# Patient Record
Sex: FEMALE | Race: WHITE
[De-identification: names, ages, dates, MRNs, and addresses within clinical notes are randomized per-mention and may not be internally consistent; named-entity substitution may affect disease eponyms.]

---

## 2017-04-18 ENCOUNTER — HOSPITAL ENCOUNTER (INPATIENT)
Dept: HOSPITAL 4 - SED | Age: 77
LOS: 5 days | Discharge: HOME | DRG: 371 | End: 2017-04-23
Attending: INTERNAL MEDICINE | Admitting: INTERNAL MEDICINE
Payer: COMMERCIAL

## 2017-04-18 VITALS
DIASTOLIC BLOOD PRESSURE: 61 MMHG | TEMPERATURE: 97.9 F | HEART RATE: 99 BPM | OXYGEN SATURATION: 95 % | SYSTOLIC BLOOD PRESSURE: 155 MMHG | RESPIRATION RATE: 20 BRPM

## 2017-04-18 VITALS
SYSTOLIC BLOOD PRESSURE: 167 MMHG | DIASTOLIC BLOOD PRESSURE: 85 MMHG | HEART RATE: 95 BPM | RESPIRATION RATE: 16 BRPM | TEMPERATURE: 98.1 F | OXYGEN SATURATION: 99 %

## 2017-04-18 VITALS — WEIGHT: 177 LBS | BODY MASS INDEX: 30.22 KG/M2 | HEIGHT: 64 IN

## 2017-04-18 VITALS
TEMPERATURE: 97.9 F | OXYGEN SATURATION: 98 % | RESPIRATION RATE: 18 BRPM | HEART RATE: 62 BPM | DIASTOLIC BLOOD PRESSURE: 58 MMHG | SYSTOLIC BLOOD PRESSURE: 158 MMHG

## 2017-04-18 VITALS
SYSTOLIC BLOOD PRESSURE: 167 MMHG | RESPIRATION RATE: 16 BRPM | TEMPERATURE: 98.1 F | HEART RATE: 95 BPM | OXYGEN SATURATION: 99 % | DIASTOLIC BLOOD PRESSURE: 85 MMHG

## 2017-04-18 DIAGNOSIS — I10: ICD-10-CM

## 2017-04-18 DIAGNOSIS — E11.9: ICD-10-CM

## 2017-04-18 DIAGNOSIS — I25.10: ICD-10-CM

## 2017-04-18 DIAGNOSIS — E03.9: ICD-10-CM

## 2017-04-18 DIAGNOSIS — E87.6: ICD-10-CM

## 2017-04-18 DIAGNOSIS — E66.9: ICD-10-CM

## 2017-04-18 DIAGNOSIS — Z95.1: ICD-10-CM

## 2017-04-18 DIAGNOSIS — Z87.81: ICD-10-CM

## 2017-04-18 DIAGNOSIS — Z98.61: ICD-10-CM

## 2017-04-18 DIAGNOSIS — Z79.899: ICD-10-CM

## 2017-04-18 DIAGNOSIS — K57.30: ICD-10-CM

## 2017-04-18 DIAGNOSIS — N13.30: ICD-10-CM

## 2017-04-18 DIAGNOSIS — E78.5: ICD-10-CM

## 2017-04-18 DIAGNOSIS — E83.42: ICD-10-CM

## 2017-04-18 DIAGNOSIS — Z96.653: ICD-10-CM

## 2017-04-18 DIAGNOSIS — D86.9: ICD-10-CM

## 2017-04-18 DIAGNOSIS — R91.1: ICD-10-CM

## 2017-04-18 DIAGNOSIS — E43: ICD-10-CM

## 2017-04-18 DIAGNOSIS — Z88.8: ICD-10-CM

## 2017-04-18 DIAGNOSIS — Z79.82: ICD-10-CM

## 2017-04-18 DIAGNOSIS — A04.7: Primary | ICD-10-CM

## 2017-04-18 LAB
ALBUMIN SERPL BCP-MCNC: 2.8 G/DL (ref 3.4–4.8)
ALT SERPL W P-5'-P-CCNC: 57 U/L (ref 12–78)
ANION GAP SERPL CALCULATED.3IONS-SCNC: 4 MMOL/L (ref 5–15)
APPEARANCE UR: CLEAR
AST SERPL W P-5'-P-CCNC: 84 U/L (ref 10–37)
BACTERIA URNS QL MICRO: (no result) /HPF
BASOPHILS # BLD AUTO: 0.2 K/UL (ref 0–0.2)
BASOPHILS NFR BLD AUTO: 1.5 % (ref 0–2)
BILIRUB SERPL-MCNC: 0.6 MG/DL (ref 0–1)
BILIRUB UR QL STRIP: NEGATIVE
BUN SERPL-MCNC: 2 MG/DL (ref 8–21)
CALCIUM SERPL-MCNC: 8.3 MG/DL (ref 8.4–11)
CHLORIDE SERPL-SCNC: 102 MMOL/L (ref 98–107)
COLOR UR: YELLOW
CREAT SERPL-MCNC: 1.32 MG/DL (ref 0.55–1.3)
EOSINOPHIL # BLD AUTO: 0.2 K/UL (ref 0–0.4)
EOSINOPHIL NFR BLD AUTO: 2.2 % (ref 0–4)
ERYTHROCYTE [DISTWIDTH] IN BLOOD BY AUTOMATED COUNT: 14.1 % (ref 9–15)
GFR SERPL CREATININE-BSD FRML MDRD: (no result) ML/MIN (ref 90–?)
GLUCOSE SERPL-MCNC: 203 MG/DL (ref 70–99)
GLUCOSE UR STRIP-MCNC: NEGATIVE MG/DL
HCT VFR BLD AUTO: 31.5 % (ref 36–48)
HGB BLD-MCNC: 10.4 G/DL (ref 12–16)
HGB UR QL STRIP: NEGATIVE
INR PPP: 1.2 (ref 0.8–1.2)
KETONES UR STRIP-MCNC: NEGATIVE MG/DL
LEUKOCYTE ESTERASE UR QL STRIP: (no result)
LYMPHOCYTES # BLD AUTO: 1.4 K/UL (ref 1–5.5)
LYMPHOCYTES NFR BLD AUTO: 13.5 % (ref 20.5–51.5)
MCH RBC QN AUTO: 30 PG (ref 27–31)
MCHC RBC AUTO-ENTMCNC: 33 % (ref 32–36)
MCV RBC AUTO: 89 FL (ref 79–98)
MONOCYTES # BLD MANUAL: 0.9 K/UL (ref 0–1)
MONOCYTES # BLD MANUAL: 8.4 % (ref 1.7–9.3)
NEUTROPHILS # BLD AUTO: 7.6 K/UL (ref 1.8–7.7)
NEUTROPHILS NFR BLD AUTO: 74.4 % (ref 40–70)
NITRITE UR QL STRIP: NEGATIVE
PH UR STRIP: 6.5 [PH] (ref 5–8)
PLATELET # BLD AUTO: 452 K/UL (ref 130–430)
POTASSIUM SERPL-SCNC: 2.4 MMOL/L (ref 3.5–5.1)
PROT SERPL-MCNC: 6.3 G/DL (ref 6.4–8.3)
PROT UR QL STRIP: NEGATIVE
PROTHROMBIN TIME: 13.4 SECS (ref 9.5–12.5)
RBC # BLD AUTO: 3.54 MIL/UL (ref 4.2–6.2)
RBC #/AREA URNS HPF: (no result) /HPF (ref 0–3)
SODIUM SERPLBLD-SCNC: 138 MMOL/L (ref 136–145)
SP GR UR STRIP: <1.005 (ref 1–1.03)
UROBILINOGEN UR STRIP-MCNC: 0.2 MG/DL (ref 0.2–1)
WBC # BLD AUTO: 10.3 K/UL (ref 4.8–10.8)
WBC #/AREA URNS HPF: (no result) /HPF (ref 0–3)

## 2017-04-18 RX ADMIN — DEXTROSE AND SODIUM CHLORIDE SCH MLS/HR: 5; 900 INJECTION, SOLUTION INTRAVENOUS at 18:30

## 2017-04-18 RX ADMIN — VANCOMYCIN HYDROCHLORIDE SCH MG: 250 CAPSULE ORAL at 18:06

## 2017-04-18 RX ADMIN — VANCOMYCIN HYDROCHLORIDE SCH MG: 250 CAPSULE ORAL at 23:39

## 2017-04-18 RX ADMIN — METRONIDAZOLE SCH MLS/HR: 500 SOLUTION INTRAVENOUS at 21:35

## 2017-04-18 NOTE — NUR
# 20 gauge angiocath placed to LHA.  Use of asceptic technique.  Opsite placed 
over site.  Blood return noted.  Blood for lab drawn from site.  Flushed with 
10 cc of normal saline.  No evidence of infiltration noted.  Patient tolerated 
well.

## 2017-04-18 NOTE — NUR
OPENING NOTES

REPORT GIVEN AT BEDSIDE BY DAY SHIFT NURSE. PATIENT RESTING IN SEMI-SAWANT'S POSITION, NO 
S/S OF ACUTE DISTRESS NOTED. RESPIRATIONS EVEN AND UNLABORED. IV FLUIDS INFUSING WITH NO 
SIGNS OF INFILTRATION NOTED. FAMILY AT BEDSIDE. BED IN LOWEST POSITION, BED ALARM ON, CALL 
LIGHT WITHIN REACH. WILL CONTINUE TO MONITOR.

## 2017-04-18 NOTE — NUR
OPENING NOTES

RCEIVED PT IN  BED, PT DENIES PAIN, PT C/O OF FEELING COLD AND SHIVERING FROM TIME TO TIME. 
AFEBRILE, PROVIDED BLANKETS AS REQUESTED. CALL LIGHT IN REACH, BED IN LOW POSITION. 
INSTRUCTED TO CALL FOR ASSIST AND PAIN MED ANY TIME

## 2017-04-18 NOTE — NUR
ROUNDS

NO S/S OF ACUTE DISTRESS NOTED. IV FLUIDS INFUSING WITH NO SIGNS OF INFILTRATION NOTED. IV 
ANTIBIOTICS INFUSING, PATIENT TOLERATING WELL. FAMILY AT BEDSIDE. BED IN LOWEST POSITION, 
BED ALARM ON, CALL LIGHT WITHIN REACH. WILL CONTINUE TO MONITOR.

## 2017-04-18 NOTE — NUR
DR. BRAD VICTOR

PATIENT IS COMPLAINING OF ANXIETY AND REQUESTING MEDICATION. PATIENT STATES, "I NEED 
MEDICATION FOR ANXIETY RIGHT NOW, CAN YOU PLEASE CALL THE DR."

## 2017-04-18 NOTE — NUR
ADMIT NOTE

Received pt from ER to the floor with a diagnosis of c. diff, colitis. Admission process 
initiated. patient oriented to pain management, safety and call light-teach back done.

## 2017-04-18 NOTE — NUR
Pt has 24 GA PIV to LHA. Area appears red and swollen. PIV discontinued with 
angiocath tip intact, no bleeding, drainage or discharge from site. Bandage 
applied.

## 2017-04-18 NOTE — NUR
CONSULTATION CALLED 



REASON FOR CONSULT: C-DIFF 

PERSON WHO WAS CALLED: HOSP EXCHANGED  

CONSULTING DR: GUALBERTO BOSCH

## 2017-04-18 NOTE — NUR
Patient will be admitted to care of Dr. Dang.  Admitted to med surg unit.  
Will go to room 133. Summary report printed. Report given to Carmen MENDOZA.

## 2017-04-18 NOTE — NUR
Pt report received from REJI Orellana. Pt from [facility] r/t diarrhea and 
C. Difficile x 1 week. Family members state that pt requested to come to [facility] 
for admit.

## 2017-04-18 NOTE — NUR
CLOSING NOTES, 

PT IN BED, FAMILY AT BEDSIDE, SET UP FOR DINNER CLEAR LIQUID DIET, 1800 MED OFFERED AND 
TAKEN. CALL LIGHT IN REACH, BED IN LOW POSTION. IV FLUIDS INFUSING. NO C./O PAIN.

## 2017-04-18 NOTE — NUR
-------------------------------------------------------------------------------

            *** Note sadi in EDM - 04/18/17 at 1339 by SURESH ***            

-------------------------------------------------------------------------------

Pt has 24 GA PIV to RHA. Area appears red and swollen. PIV discontinued with 
angiocath tip intact, no bleeding, drainage or discharge from site. Bandage 
applied.

## 2017-04-18 NOTE — NUR
PAGED:

I PAGED STAR JOHNSON @ [phone]

I SPOKE WITH AISHA ALEX ON CALL

NUMBER I CALLED: [phone]

## 2017-04-19 VITALS
RESPIRATION RATE: 17 BRPM | TEMPERATURE: 98.7 F | SYSTOLIC BLOOD PRESSURE: 143 MMHG | HEART RATE: 97 BPM | OXYGEN SATURATION: 93 % | DIASTOLIC BLOOD PRESSURE: 59 MMHG

## 2017-04-19 VITALS
RESPIRATION RATE: 16 BRPM | OXYGEN SATURATION: 94 % | SYSTOLIC BLOOD PRESSURE: 152 MMHG | TEMPERATURE: 97.1 F | DIASTOLIC BLOOD PRESSURE: 76 MMHG | HEART RATE: 95 BPM

## 2017-04-19 VITALS
DIASTOLIC BLOOD PRESSURE: 63 MMHG | TEMPERATURE: 97.8 F | HEART RATE: 101 BPM | SYSTOLIC BLOOD PRESSURE: 156 MMHG | OXYGEN SATURATION: 97 % | RESPIRATION RATE: 20 BRPM

## 2017-04-19 VITALS
TEMPERATURE: 97.2 F | OXYGEN SATURATION: 94 % | DIASTOLIC BLOOD PRESSURE: 72 MMHG | SYSTOLIC BLOOD PRESSURE: 141 MMHG | RESPIRATION RATE: 18 BRPM | HEART RATE: 94 BPM

## 2017-04-19 VITALS
TEMPERATURE: 99.6 F | OXYGEN SATURATION: 95 % | SYSTOLIC BLOOD PRESSURE: 151 MMHG | DIASTOLIC BLOOD PRESSURE: 76 MMHG | RESPIRATION RATE: 18 BRPM | HEART RATE: 94 BPM

## 2017-04-19 VITALS
SYSTOLIC BLOOD PRESSURE: 168 MMHG | DIASTOLIC BLOOD PRESSURE: 67 MMHG | OXYGEN SATURATION: 100 % | RESPIRATION RATE: 18 BRPM | TEMPERATURE: 98.1 F | HEART RATE: 100 BPM

## 2017-04-19 VITALS — HEART RATE: 94 BPM

## 2017-04-19 LAB
ALBUMIN SERPL BCP-MCNC: 2.6 G/DL (ref 3.4–4.8)
ALT SERPL W P-5'-P-CCNC: 48 U/L (ref 12–78)
ANION GAP SERPL CALCULATED.3IONS-SCNC: 6 MMOL/L (ref 5–15)
AST SERPL W P-5'-P-CCNC: 55 U/L (ref 10–37)
BILIRUB SERPL-MCNC: 0.6 MG/DL (ref 0–1)
BUN SERPL-MCNC: 3 MG/DL (ref 8–21)
CALCIUM SERPL-MCNC: 7.9 MG/DL (ref 8.4–11)
CHLORIDE SERPL-SCNC: 106 MMOL/L (ref 98–107)
CREAT SERPL-MCNC: 1.21 MG/DL (ref 0.55–1.3)
GFR SERPL CREATININE-BSD FRML MDRD: (no result) ML/MIN (ref 90–?)
GLUCOSE SERPL-MCNC: 135 MG/DL (ref 70–99)
POTASSIUM SERPL-SCNC: 3.1 MMOL/L (ref 3.5–5.1)
POTASSIUM SERPL-SCNC: 3.9 MMOL/L (ref 3.5–5.1)
PROT SERPL-MCNC: 6 G/DL (ref 6.4–8.3)
SODIUM SERPLBLD-SCNC: 142 MMOL/L (ref 136–145)

## 2017-04-19 RX ADMIN — VANCOMYCIN HYDROCHLORIDE SCH MG: 250 CAPSULE ORAL at 18:31

## 2017-04-19 RX ADMIN — Medication SCH CAP: at 20:38

## 2017-04-19 RX ADMIN — DEXTROSE AND SODIUM CHLORIDE SCH MLS/HR: 5; 900 INJECTION, SOLUTION INTRAVENOUS at 04:30

## 2017-04-19 RX ADMIN — VANCOMYCIN HYDROCHLORIDE SCH MG: 250 CAPSULE ORAL at 11:56

## 2017-04-19 RX ADMIN — DEXTROSE AND SODIUM CHLORIDE SCH MLS/HR: 5; 900 INJECTION, SOLUTION INTRAVENOUS at 17:05

## 2017-04-19 RX ADMIN — POTASSIUM CHLORIDE SCH MEQ: 1.5 POWDER, FOR SOLUTION ORAL at 13:37

## 2017-04-19 RX ADMIN — POTASSIUM CHLORIDE SCH MEQ: 1.5 POWDER, FOR SOLUTION ORAL at 09:25

## 2017-04-19 RX ADMIN — DEXTROSE AND SODIUM CHLORIDE SCH MLS/HR: 5; 900 INJECTION, SOLUTION INTRAVENOUS at 18:31

## 2017-04-19 RX ADMIN — VANCOMYCIN HYDROCHLORIDE SCH MG: 250 CAPSULE ORAL at 06:17

## 2017-04-19 RX ADMIN — METRONIDAZOLE SCH MLS/HR: 500 SOLUTION INTRAVENOUS at 20:39

## 2017-04-19 RX ADMIN — VANCOMYCIN HYDROCHLORIDE SCH MG: 250 CAPSULE ORAL at 23:07

## 2017-04-19 RX ADMIN — METRONIDAZOLE SCH MLS/HR: 500 SOLUTION INTRAVENOUS at 06:18

## 2017-04-19 RX ADMIN — METRONIDAZOLE SCH MLS/HR: 500 SOLUTION INTRAVENOUS at 13:37

## 2017-04-19 RX ADMIN — MONTELUKAST SCH MG: 10 TABLET, FILM COATED ORAL at 18:30

## 2017-04-19 RX ADMIN — Medication SCH MG: at 09:25

## 2017-04-19 RX ADMIN — IPRATROPIUM BROMIDE AND ALBUTEROL SULFATE SCH ML: .5; 3 SOLUTION RESPIRATORY (INHALATION) at 20:07

## 2017-04-19 RX ADMIN — ATORVASTATIN CALCIUM SCH MG: 20 TABLET, FILM COATED ORAL at 09:25

## 2017-04-19 NOTE — NUR
Wicho scale evaluation:



Patient evaluated for a low Wicho score of 16. Patient was awake, alert, oriented, and 
received in a Innis bed with an Isoflex JIL mattress. Low air loss therapy is initiated. 
Patient is able to turn in bed independently, and uses bedside commode with assist. Skin is 
fair; Left lower extremity has multiple areas of bruising secondary to a recent left total 
hip arthroplasty. Encourage and assist patient as needed with repositioning every 2 hours 
with pillow support, and offload pressure areas with pillows for pressure redistribution 
elevate, offload, and flow bilateral heels with pillows. Perform skin care and monitor skin 
integrity every shift. Maintain patient on a low air loss mattress.

## 2017-04-19 NOTE — NUR
ROUNDS

PATIENT IN BATHROOM WITH ASSIST OF CNA. GAIT WAS STEADY WITH ASSIST. PATIENT NOW RESTING IN 
BED IN SEMI-SAWANT'S NO S/S OF ACUTE DISTRESS NOTED. FALL PRECAUTIONS IN PLACE, WILL 
CONTINUE TO MONITOR, CALL LIGHT WITHIN REACH.

## 2017-04-19 NOTE — NUR
ROUNDS

PATIENT IS SLEEPING WITH VISIBLE RISE AND FALL OF CHEST NOTED. NO ACUTE S/S OF DISTRESS.  
BED IN LOWEST POSITION, FALL PRECAUTIONS IN PLACE, BED ALARM ON. CALL LIGHT WITHIN REACH. 
WILL CONTINUE TO MONITOR

## 2017-04-19 NOTE — NUR
NOTE:

PATIENT IS RESTING COMFORTABLY. NO S/S OF DISTRESS OR SOB. PATIENT IS ALERT AND ORIENTED, 
ABLE TO EXPRESS NEEDS, AND ASK FOR ASSISTANCE. FAMILY AT BEDSIDE. CALL LIGHT IN REACH, BED 
IN LOWEST POSITION, AND WILL CONTINUE TO MONITOR.

## 2017-04-19 NOTE — NUR
Nutrition Update



Wicho Scale 16 noted.

Pt admitted for C. diff colitis.

Diet: clear liquid

BMI: 30.4 kg/m2



RD to follow per nutrition care standards.

## 2017-04-19 NOTE — NUR
NOTE:

PATIENT IS RESTING COMFORTABLY IN BED. NO S/S OF DISTRESS OR SOB. PATIENT IS AWAKE AND 
ALERT. FAMILY AT BEDSIDE. CALL LIGHT IN REACH, BED IN LOWEST POSITION, AND WILL CONTINUE TO 
MONITOR.

## 2017-04-19 NOTE — NUR
ROUNDS

ASSISTED PATIENT TO BATHROOM. GAIT WAS STEADY WITH ASSIST. PATIENT NOW RESTING IN BED IN 
SEMI-SAWANT'S NO S/S OF ACUTE DISTRESS NOTED. FALL PRECAUTIONS IN PLACE, WILL CONTINUE TO 
MONITOR, CALL LIGHT WITHIN REACH.

## 2017-04-19 NOTE — NUR
DR. SALINAS 

SPOKE WITH DR. SALINAS. FAMILY IS REQUESTING CONSULT WITH DR. MARTI. FAMILY STATES PATIENT 
HAS SARCOIDOSIS AND IS DEVELOPING A COUGHT. DR. MARTI WAS ON HER CASE PREVIOUSLY AND SEEING 
HER TWICE A DAY AND THEY WOULD LIKE HIM TO CONTINUE HIS CARE HERE. DR. SALINAS SAID OKAY TO 
PLACE ORDER FOR PULMONARY CONSULT WITH DR. MARTI. ORDERS NOTED AND CARRIED OUT. FAMILY 
INFORMED.

## 2017-04-19 NOTE — NUR
NOTE:

PATIENT IS RESTING COMFORTABLY IN BED. NO S/S OF DISTRESS OR SOB. PATIENT IS ALERT AND 
ORIENTED, ABLE TO EXPRESS NEEDS, AND ASK FOR ASSISTANCE. FAMILY AT BEDSIDE. CALL LIGHT IN 
REACH, BED IN LOWEST POSITION, AND WILL CONTINUE TO MONITOR.

## 2017-04-19 NOTE — NUR
CLOSING NOTE:

PATIENT IS RESTING COMFORTABLY IN BED. NO S/S OF DISTRESS OR SOB. PATIENT IS ALERT AND 
ORIENTED, ABLE TO EXPRESS NEEDS, AND ASK FOR ASSISTANCE. FAMILY IS AT BEDSIDE. CALL LIGHT IN 
REACH, BED IN LOWEST POSITION, AND WILL GIVE REPORT TO NIGHT NURSE.

## 2017-04-19 NOTE — NUR
OPENING NOTE:

RECEIVED REPORT FROM NIGHT NURSE. PATIENT IS RESTING COMFORTABLY IN BED. NO S/S OF DISTRESS 
OR SOB. FAMILY AT BEDSIDE. PATIENT IS ALERT AND ORIENTED, ABLE TO EXPRESS NEEDS, AND ASK FOR 
ASSISTANCE. VITAL SIGNS WNL, ASSESSMENT COMPLETE. IV IA PATENT AND INFUSING. CALL LIGHT IN 
REACH, BED IN LOWEST POSITION, AND WILL CONTINUE TO MONITOR.

## 2017-04-19 NOTE — NUR
INITIAL ROUNDS



RECVD PT IN BED, A/A/O X3 WITH DAUGHTER @ BEDSIDE. NO S/S OF ANY PAIN. IV NOTED TO L HAND G 
20, NO INFILTRATE WITH GOOD BLOOD RETURN. BUE ARE STRONG WITH MILD WEAKNESS TO BLE, 
AMBULATORY WITH WALKER AND WITH ASSIST. DISCUSSED PLAN OF CARE WITH PT AND VERBALIZED 
UNDERSTANDING. BED IN LOW POSITION WITH CALL LIGHT WITHIN REACH. WILL CONT TO MONITOR

## 2017-04-19 NOTE — NUR
ASSISTED TO COMMODE



ASSISTED PT TO COMMODE AND SAFELY BACK TO BED WITH ELENA SIFUENTES. NO S/S OF ANY PAIN AND NO 
DISTRESS NOTED. DAUGHTER @ BEDSIDE.CALL LIGHT WITHIN REACH, WILL CONT TO MONITOR.

## 2017-04-19 NOTE — NUR
ROUNDS



PT IS RESTING COMFORTABLY WITH DAUGHTER AT BEDSIDE. NO S/S OF ANY PAIN OR DISTRESS NOTED. 
BED IN LOW POSITION WITH CALL LIGHT WITHIN REACH. WILL CONT TO MONITOR.

## 2017-04-19 NOTE — NUR
CLOSING NOTES

PATIENT SLEEPING COMFORTABLY WITH VISIBLE RISE AND FALL OF CHEST. NO S/S OF ACUTE DISTRESS 
NOTED. IV PATENT AND INFUSING. DAUGHTER AT BEDSIDE. BED IN LOWEST POSITION, BED ALARM ON, 
CALL LIGHT WITHIN REACH. WILL ENDORSE CARE TO DAY SHIFT NURSE.

## 2017-04-20 VITALS
RESPIRATION RATE: 18 BRPM | DIASTOLIC BLOOD PRESSURE: 72 MMHG | SYSTOLIC BLOOD PRESSURE: 158 MMHG | HEART RATE: 96 BPM | OXYGEN SATURATION: 92 % | TEMPERATURE: 99 F

## 2017-04-20 VITALS
HEART RATE: 97 BPM | SYSTOLIC BLOOD PRESSURE: 144 MMHG | OXYGEN SATURATION: 97 % | RESPIRATION RATE: 17 BRPM | DIASTOLIC BLOOD PRESSURE: 69 MMHG | TEMPERATURE: 97.8 F

## 2017-04-20 VITALS
HEART RATE: 97 BPM | TEMPERATURE: 98.9 F | RESPIRATION RATE: 18 BRPM | DIASTOLIC BLOOD PRESSURE: 77 MMHG | SYSTOLIC BLOOD PRESSURE: 157 MMHG | OXYGEN SATURATION: 92 %

## 2017-04-20 VITALS
SYSTOLIC BLOOD PRESSURE: 136 MMHG | TEMPERATURE: 99 F | HEART RATE: 113 BPM | RESPIRATION RATE: 30 BRPM | DIASTOLIC BLOOD PRESSURE: 54 MMHG | OXYGEN SATURATION: 90 %

## 2017-04-20 VITALS
RESPIRATION RATE: 18 BRPM | SYSTOLIC BLOOD PRESSURE: 155 MMHG | TEMPERATURE: 99 F | DIASTOLIC BLOOD PRESSURE: 70 MMHG | HEART RATE: 107 BPM | OXYGEN SATURATION: 92 %

## 2017-04-20 VITALS
DIASTOLIC BLOOD PRESSURE: 63 MMHG | TEMPERATURE: 98 F | SYSTOLIC BLOOD PRESSURE: 140 MMHG | RESPIRATION RATE: 16 BRPM | HEART RATE: 100 BPM | OXYGEN SATURATION: 97 %

## 2017-04-20 LAB
ALBUMIN SERPL BCP-MCNC: 2.6 G/DL (ref 3.4–4.8)
ALT SERPL W P-5'-P-CCNC: 38 U/L (ref 12–78)
ANION GAP SERPL CALCULATED.3IONS-SCNC: 4 MMOL/L (ref 5–15)
AST SERPL W P-5'-P-CCNC: 33 U/L (ref 10–37)
BASOPHILS # BLD AUTO: 0 K/UL (ref 0–0.2)
BASOPHILS NFR BLD AUTO: 0.1 % (ref 0–2)
BILIRUB SERPL-MCNC: 0.5 MG/DL (ref 0–1)
BUN SERPL-MCNC: 1 MG/DL (ref 8–21)
CALCIUM SERPL-MCNC: 7.8 MG/DL (ref 8.4–11)
CHLORIDE SERPL-SCNC: 104 MMOL/L (ref 98–107)
CREAT SERPL-MCNC: 0.96 MG/DL (ref 0.55–1.3)
EOSINOPHIL # BLD AUTO: 0.3 K/UL (ref 0–0.4)
EOSINOPHIL NFR BLD AUTO: 3.5 % (ref 0–4)
ERYTHROCYTE [DISTWIDTH] IN BLOOD BY AUTOMATED COUNT: 14.4 % (ref 9–15)
GFR SERPL CREATININE-BSD FRML MDRD: (no result) ML/MIN (ref 90–?)
GLUCOSE SERPL-MCNC: 138 MG/DL (ref 70–99)
HCT VFR BLD AUTO: 31.4 % (ref 36–48)
HGB BLD-MCNC: 10.2 G/DL (ref 12–16)
LYMPHOCYTES # BLD AUTO: 1.5 K/UL (ref 1–5.5)
LYMPHOCYTES NFR BLD AUTO: 16.6 % (ref 20.5–51.5)
MCH RBC QN AUTO: 29 PG (ref 27–31)
MCHC RBC AUTO-ENTMCNC: 32 % (ref 32–36)
MCV RBC AUTO: 90 FL (ref 79–98)
MONOCYTES # BLD MANUAL: 1 K/UL (ref 0–1)
MONOCYTES # BLD MANUAL: 10.8 % (ref 1.7–9.3)
NEUTROPHILS # BLD AUTO: 6.1 K/UL (ref 1.8–7.7)
NEUTROPHILS NFR BLD AUTO: 69 % (ref 40–70)
PLATELET # BLD AUTO: 403 K/UL (ref 130–430)
POTASSIUM SERPL-SCNC: 2.9 MMOL/L (ref 3.5–5.1)
PROT SERPL-MCNC: 5.7 G/DL (ref 6.4–8.3)
RBC # BLD AUTO: 3.49 MIL/UL (ref 4.2–6.2)
SODIUM SERPLBLD-SCNC: 140 MMOL/L (ref 136–145)
WBC # BLD AUTO: 8.9 K/UL (ref 4.8–10.8)

## 2017-04-20 RX ADMIN — Medication SCH CAP: at 21:36

## 2017-04-20 RX ADMIN — METRONIDAZOLE SCH MLS/HR: 500 SOLUTION INTRAVENOUS at 21:35

## 2017-04-20 RX ADMIN — POTASSIUM CHLORIDE SCH MEQ: 20 TABLET, EXTENDED RELEASE ORAL at 15:16

## 2017-04-20 RX ADMIN — Medication SCH CAP: at 09:35

## 2017-04-20 RX ADMIN — VANCOMYCIN HYDROCHLORIDE SCH MG: 250 CAPSULE ORAL at 23:10

## 2017-04-20 RX ADMIN — Medication PRN MG: at 13:27

## 2017-04-20 RX ADMIN — ATORVASTATIN CALCIUM SCH MG: 20 TABLET, FILM COATED ORAL at 09:36

## 2017-04-20 RX ADMIN — Medication SCH MG: at 09:35

## 2017-04-20 RX ADMIN — SODIUM CHLORIDE SCH MLS/HR: 9 INJECTION, SOLUTION INTRAVENOUS at 15:18

## 2017-04-20 RX ADMIN — VANCOMYCIN HYDROCHLORIDE SCH MG: 250 CAPSULE ORAL at 12:00

## 2017-04-20 RX ADMIN — IPRATROPIUM BROMIDE AND ALBUTEROL SULFATE SCH ML: .5; 3 SOLUTION RESPIRATORY (INHALATION) at 20:13

## 2017-04-20 RX ADMIN — VANCOMYCIN HYDROCHLORIDE SCH MG: 250 CAPSULE ORAL at 06:23

## 2017-04-20 RX ADMIN — MONTELUKAST SCH MG: 10 TABLET, FILM COATED ORAL at 17:04

## 2017-04-20 RX ADMIN — IPRATROPIUM BROMIDE AND ALBUTEROL SULFATE SCH ML: .5; 3 SOLUTION RESPIRATORY (INHALATION) at 16:37

## 2017-04-20 RX ADMIN — IPRATROPIUM BROMIDE AND ALBUTEROL SULFATE SCH ML: .5; 3 SOLUTION RESPIRATORY (INHALATION) at 09:04

## 2017-04-20 RX ADMIN — DEXTROSE AND SODIUM CHLORIDE SCH MLS/HR: 5; 900 INJECTION, SOLUTION INTRAVENOUS at 13:30

## 2017-04-20 RX ADMIN — POTASSIUM CHLORIDE SCH MEQ: 20 TABLET, EXTENDED RELEASE ORAL at 13:27

## 2017-04-20 RX ADMIN — METRONIDAZOLE SCH MLS/HR: 500 SOLUTION INTRAVENOUS at 06:23

## 2017-04-20 RX ADMIN — LEVOTHYROXINE SODIUM SCH MG: 50 TABLET ORAL at 06:24

## 2017-04-20 RX ADMIN — METRONIDAZOLE SCH MLS/HR: 500 SOLUTION INTRAVENOUS at 15:03

## 2017-04-20 RX ADMIN — CLOPIDOGREL BISULFATE SCH MG: 75 TABLET, FILM COATED ORAL at 09:36

## 2017-04-20 RX ADMIN — VANCOMYCIN HYDROCHLORIDE SCH MG: 250 CAPSULE ORAL at 17:04

## 2017-04-20 NOTE — NUR
OPENING NOTES

REPORT GIVEN AT BEDSIDE BY DAY SHIFT NURSE. PATIENT IS RESTING IN SEMI-SAWANT'S POSITION 
WITH DAUGHTER AT BEDSIDE. IV IS PATENT AND RUNNING. NO S/S OF DISTRESS NOTED. BED IN LOWEST 
POSITION, BED ALARM ON, CALL LIGHT WITHIN REACH. WILL CONTINUE TO MONITOR.

## 2017-04-20 NOTE — NUR
ROUNDS

PATIENT IS SITTING IN SEMI-SAWANT'S POSITION WITH NASAL CANNULA AT 2L. OXYGEN STAURATIONS 
ARE. BREATHING IS EVEN AND UNLABORED. PATIENT IS COUGHING. NO S/S OF ACUTE DISTRESS NOTED. 
WILL CONTINUE TO MONITOR. FALL PREACUTIONS IN PLACE AND DAUGHTER AT BEDSIDE. CALL LIGHT 
WITHIN REACH.

## 2017-04-20 NOTE — NUR
FOLLOW-UP ON CONSULTATION PAGED



REASON FOR CONSULTATION:C.DIFF, COLITIS

WAS CONSULT CALLED?Y

PERSON WHO WAS NOTIFIED:ROGERIO

CONSULTING PHYSICIAN:LEONARD HOBBS

CONSULTANT SPECIALTY:INFEXTIOUS DISEASE

CONSULTANT PHONE NUMBER:[phone]

## 2017-04-20 NOTE — NUR
ROUNDS

PATIENT IS BREATHING SHALLOW WITH RESPIRATIONS OF 32 AND OXYGEN SATURATION OF 88-89%. LUNGS 
HAVE A WHEEZE BILATERAL IN UPPER LOBES, AND CRACKLES NOTED IN LOWER LOBES. PATIENT DOES HAVE 
A HISTORY OF COPD AND SARCOIDOSIS. O2 APPLIED AT 2L BY NASAL CANNULA. FALL PRECAUTIONS IN 
PLACE, WILL CONTINUE TO MONITOR FREQUENTLY.

## 2017-04-20 NOTE — NUR
ASSISTED TO COMMODE



PT WAS ASSISTED TO COMMODE BY ELENA SIFUENTES. PT REFUSED TO BE ASSISTED BY MALE NURSE D/T 
Mormonism BELIEFS. BED IN LOW POSITION WITH CALL LIGHT WITHIN REACH. WILL CONT TO MONITOR.

## 2017-04-20 NOTE — NUR
TEMP 99.8



PT'S DAUGHTER INSISTING THAT HER MOTHER HAS A FEVER. I EXPLAINED TO DAUGHTER THAT WE ONLY 
CONSIDER FEVER IF TEMP .4 AND ABOVE. REMOVED 3 BLANKETS FROM PT AND GAVE COOLING 
MEASURES. WILL RECHECK TEMP AGAIN.

## 2017-04-20 NOTE — NUR
rn notes:

patient is aaox 4. afebrile. vss stable. has iv access on the left hand 320.D5ns at 70cc/hr 
infusing on well. daughter at the bedside. hob of bed elevated. bed in low position. call 
lights within reach. safety measures maintained. no sob nor distress noted. non productive 
cough with small phelgm noted. informed patient to [facility] for assistance. still on contact 
isolation for c dificile precaution.

## 2017-04-20 NOTE — NUR
patient is sitting on the chair. son is at the bedside. patient is stable. no pain nor 
distress noted.

## 2017-04-20 NOTE — NUR
FINAL ROUNDS



PT IS RESTING @ THIS TIME. NO S/S OF PAIN OR ANY DISTRESS. V/S ARE WNL. ALL NEEDS MET AND 
ANTICIPATED BY NOC NURSES. BED IN LOW POSITION WITH SIDE RAILS UP X 2 FOR SAFETY. CALL LIGHT 
WITHIN REACH; ENDORSED.

## 2017-04-21 VITALS
SYSTOLIC BLOOD PRESSURE: 142 MMHG | TEMPERATURE: 99.7 F | DIASTOLIC BLOOD PRESSURE: 62 MMHG | HEART RATE: 113 BPM | RESPIRATION RATE: 16 BRPM | OXYGEN SATURATION: 93 %

## 2017-04-21 VITALS
OXYGEN SATURATION: 92 % | DIASTOLIC BLOOD PRESSURE: 66 MMHG | RESPIRATION RATE: 21 BRPM | TEMPERATURE: 97.2 F | HEART RATE: 100 BPM | SYSTOLIC BLOOD PRESSURE: 145 MMHG

## 2017-04-21 VITALS
SYSTOLIC BLOOD PRESSURE: 148 MMHG | DIASTOLIC BLOOD PRESSURE: 65 MMHG | HEART RATE: 102 BPM | RESPIRATION RATE: 18 BRPM | TEMPERATURE: 98.8 F | OXYGEN SATURATION: 91 %

## 2017-04-21 VITALS
HEART RATE: 92 BPM | OXYGEN SATURATION: 95 % | SYSTOLIC BLOOD PRESSURE: 139 MMHG | TEMPERATURE: 97.6 F | DIASTOLIC BLOOD PRESSURE: 64 MMHG | RESPIRATION RATE: 16 BRPM

## 2017-04-21 VITALS
RESPIRATION RATE: 15 BRPM | DIASTOLIC BLOOD PRESSURE: 56 MMHG | OXYGEN SATURATION: 96 % | TEMPERATURE: 97.5 F | HEART RATE: 111 BPM | SYSTOLIC BLOOD PRESSURE: 133 MMHG

## 2017-04-21 VITALS
TEMPERATURE: 97.8 F | OXYGEN SATURATION: 95 % | SYSTOLIC BLOOD PRESSURE: 142 MMHG | RESPIRATION RATE: 17 BRPM | HEART RATE: 94 BPM | DIASTOLIC BLOOD PRESSURE: 64 MMHG

## 2017-04-21 LAB
ANION GAP SERPL CALCULATED.3IONS-SCNC: 5 MMOL/L (ref 5–15)
BUN SERPL-MCNC: 3 MG/DL (ref 8–21)
CALCIUM SERPL-MCNC: 8.1 MG/DL (ref 8.4–11)
CHLORIDE SERPL-SCNC: 104 MMOL/L (ref 98–107)
CREAT SERPL-MCNC: 0.91 MG/DL (ref 0.55–1.3)
GFR SERPL CREATININE-BSD FRML MDRD: (no result) ML/MIN (ref 90–?)
GLUCOSE SERPL-MCNC: 128 MG/DL (ref 70–99)
POTASSIUM SERPL-SCNC: 4.3 MMOL/L (ref 3.5–5.1)
SODIUM SERPLBLD-SCNC: 138 MMOL/L (ref 136–145)

## 2017-04-21 RX ADMIN — VANCOMYCIN HYDROCHLORIDE SCH MG: 250 CAPSULE ORAL at 06:17

## 2017-04-21 RX ADMIN — ATORVASTATIN CALCIUM SCH MG: 20 TABLET, FILM COATED ORAL at 10:15

## 2017-04-21 RX ADMIN — Medication SCH CAP: at 21:30

## 2017-04-21 RX ADMIN — MONTELUKAST SCH MG: 10 TABLET, FILM COATED ORAL at 18:21

## 2017-04-21 RX ADMIN — IPRATROPIUM BROMIDE AND ALBUTEROL SULFATE SCH ML: .5; 3 SOLUTION RESPIRATORY (INHALATION) at 15:44

## 2017-04-21 RX ADMIN — SODIUM CHLORIDE SCH MLS/HR: 9 INJECTION, SOLUTION INTRAVENOUS at 21:32

## 2017-04-21 RX ADMIN — IPRATROPIUM BROMIDE AND ALBUTEROL SULFATE SCH ML: .5; 3 SOLUTION RESPIRATORY (INHALATION) at 21:12

## 2017-04-21 RX ADMIN — VANCOMYCIN HYDROCHLORIDE SCH MG: 250 CAPSULE ORAL at 18:21

## 2017-04-21 RX ADMIN — Medication PRN MG: at 00:11

## 2017-04-21 RX ADMIN — Medication SCH CAP: at 10:16

## 2017-04-21 RX ADMIN — Medication PRN MG: at 06:25

## 2017-04-21 RX ADMIN — IPRATROPIUM BROMIDE AND ALBUTEROL SULFATE SCH ML: .5; 3 SOLUTION RESPIRATORY (INHALATION) at 09:16

## 2017-04-21 RX ADMIN — Medication SCH MG: at 10:16

## 2017-04-21 RX ADMIN — LEVOTHYROXINE SODIUM SCH MG: 50 TABLET ORAL at 06:17

## 2017-04-21 RX ADMIN — METRONIDAZOLE SCH MLS/HR: 500 SOLUTION INTRAVENOUS at 06:18

## 2017-04-21 RX ADMIN — METRONIDAZOLE SCH MLS/HR: 500 SOLUTION INTRAVENOUS at 14:11

## 2017-04-21 RX ADMIN — VANCOMYCIN HYDROCHLORIDE SCH MG: 250 CAPSULE ORAL at 12:15

## 2017-04-21 RX ADMIN — CLOPIDOGREL BISULFATE SCH MG: 75 TABLET, FILM COATED ORAL at 10:16

## 2017-04-21 RX ADMIN — METRONIDAZOLE SCH MLS/HR: 500 SOLUTION INTRAVENOUS at 21:31

## 2017-04-21 RX ADMIN — Medication PRN MG: at 18:26

## 2017-04-21 NOTE — NUR
INITIAL NOTES:

PT A/O X3, [place] SPEAKING, FAMILY AT BEDSIDE ; NOT IN ANY ACUTE DISTRESS; VITALS ARE STABLE 
; DENIED ANY PAIN OR DISCOMFORT AT THIS TIME; ASSESSMENT DONE ; NOTICED BRUISES TO BOTH ARMS 
, THIGH, AND ABDOMEN ; LEFT HIP WITH HEALED INCISION;PT IS COUGHING OCCASIONALLY , MEDICATED 
EARLIER ; ON ROOM AIR AT THIS TIME; 91% ,PT HAS THE H/O COPD ; O2 NOTED AT BEDSIDE , WILL 
MONITOR  ; BED IN LOW AND  LOCK POSITION ; CALL BELL IN REACH . WILL CONTINUE TO MONITOR.

-------------------------------------------------------------------------------

Addendum: 04/22/17 at 0040 by Roosevelt Pinto RN

-------------------------------------------------------------------------------

FAMILY STATED THAT PT NEEDS ATIVAN , SHE GETS ANXIOUS AT TIMES , INFORMED DAUGHTER THAT 
THERE IA AN ORDER FOR ATIVAN IM ,WILL GIVE WHEN SHE GET ANXIOUS , DAUGHTER STATED THEY DONT 
WANT TO GIVE HER  IM ; INFORMED FAMILY THAT WILL CALL MD TO GET A NEW ORDER .

## 2017-04-21 NOTE — NUR
ANTI-ANXIETY MEDICATION

PATIENT GIVEN THE ORDERED DOSE OF ANTI-ANXIETY MEDICATION. WILL REASSESS. NO S/S OF ACUTE 
DISTRESS NOTED. FALL PRECAUTIONS IN PLACE. DAUGHTER AT BEDSIDE. CALL LIGHT WITHIN REACH. 
WILL CONTINUE TO MONITOR FREQUENTLY.

## 2017-04-21 NOTE — NUR
PAIN

PATIENT IS COMPLAINING OF PAIN 7/10 THROUGHOUT LEFT LEG. CURRENTLY NO PAIN MEDS ARE ORDERE. 
WILL PAGE  FOR ORDERS.

## 2017-04-21 NOTE — NUR
PAGED  FOR ORDERS

PATIENT IS ANXIOUS AND REQUESTED SOMETHING FOR ANXIETY. THERE ARE NO MEDS FOR ANXIETY,  
[place]AN FOR ORDERS

## 2017-04-21 NOTE — NUR
Dr. Eisenberg



Called and spoke with MD. From her stand point patient is cleared to be discharged. MD will 
be coming in an hour.

## 2017-04-21 NOTE — NUR
AM Notes



Pt aaox4 but Yoruba speaking only. No complaints of pain or discomfort at this time. No sob, 
difficulty breathing or distress noted. O2 via nasal canula @ 1L in place. Healing left hip 
incision noted. Bilateral scd in place. Educated about fall and safety precautions. 
Encouraged to call for assistance. Call light within reach. Will monitor.

## 2017-04-21 NOTE — NUR
Rounds



Pt asleep. No signs of facial grimacing for pain or discomfort. No distress noted. Daughter 
at bedside. Will monitor.

## 2017-04-21 NOTE — NUR
ROUNDS

PATIENT BREATHING LABORED AND EVEN. OXYGEN TITRATION LOWERED TO 1L AND PATIENT O2 SAT AT 
93%. TEMPERATURE HAS RISEN FROM 99F AT 1955 TO 99.7, COOLING MEASURES OF WET CLOTH AND ICE 
PACKS TAKEN. PATIENT IS CLAMMY AND WARM TO TOUCH. PATIENT STATES SHE IS SHIVERING. WILL 
CONTINUE TO MONITOR TEMPERATURE FREQUENTLY. DAUGHTER AT BEDSIDE. FALL PRECAUTIONS IN PLACE, 
BED ALARM ON, CALL LIGHT WITHIN REACH.

## 2017-04-21 NOTE — NUR
LCSW called and spoke with PA , Shonna ([phone]), to inform her of pt's 
discharge order.  Shonna states that she is aware and is following up regarding pt's 
discharge needs.

## 2017-04-21 NOTE — NUR
Initial Notes



Pt asleep. No signs of facial grimacing for pain or discomfort. No distress noted. Daughter 
at bedside. Encouraged to call for assistance. Will monitor.

## 2017-04-21 NOTE — NUR
Rounds



Pt awake assisted to bedside commode. No complaints of pain or discomfort. No distress 
noted. Assisted back to bed and kept comfortable. Encouraged to call for assistance. Will 
monitor.

## 2017-04-21 NOTE — NUR
CLOSING NOTES

PATIENT IS RESTING, AWAKE AND ALERT. DAUGHTER AT BEDSIDE, PATIENT STATES SHE IS NERVOUS. IV 
FLUIDS AND ANTIBIOTICS INFUSING, PATIENT TOLERATING WELL. BED IN LOWEST POSITION, BED ALARM 
ON, CALL LIGHT IN RIGHT HAND. WILL ENDORSE CARE TO DAY SHIFT.

## 2017-04-21 NOTE — NUR
PAGED  FOR ORDERS, 3RD PAGE

PATIENT IS ANXIOUS AND REQUESTED SOMETHING FOR ANXIETY. THERE ARE NO MEDS FOR ANXIETY,  
PAGED FOR ORDERS

## 2017-04-21 NOTE — NUR
PAGED  FOR ORDERS, 2ND PAGE

PATIENT IS ANXIOUS AND REQUESTED SOMETHING FOR ANXIETY. THERE ARE NO MEDS FOR ANXIETY,  
PAGED FOR ORDERS

## 2017-04-21 NOTE — NUR
Dr. Mckeon



Called and spoke with MD if patient cleared to be discharged home. MD ordered patient is 
cleared to be discharged home.

## 2017-04-21 NOTE — NUR
MEDICATION:

DUE MEDS GIVEN ; PT IS COMFORTABLE ; NOT IN ANY ACUTE DISTRESS ; WILL CONTINUE TO MONITOR.

## 2017-04-21 NOTE — NUR
ROUNDS

PATIENT RESTING, SLEEPING, WITH VISIBLE RISE AND FALL OF CHEST NOTED. NASAL CANNULA IN 
PLACE, IV FLUIDS RUNNING. NO S/S OF DISTRESS NOTED. FALL PRECAUTIONS IN PLACE. BED ALARM ON, 
CALL LIGHT WITHIN REACH. WILL CONTINUE TO MONITOR FREQUENTLY.

## 2017-04-21 NOTE — NUR
Closing Notes



Pt awake resting in bed with no significant changes noted. Kept comfortable. Encouraged to 
call for assistance. Call light within reach. Will endorse care to incoming nurse.

## 2017-04-22 VITALS
SYSTOLIC BLOOD PRESSURE: 158 MMHG | RESPIRATION RATE: 20 BRPM | HEART RATE: 102 BPM | DIASTOLIC BLOOD PRESSURE: 70 MMHG | OXYGEN SATURATION: 92 % | TEMPERATURE: 97.8 F

## 2017-04-22 VITALS
RESPIRATION RATE: 20 BRPM | HEART RATE: 113 BPM | DIASTOLIC BLOOD PRESSURE: 70 MMHG | TEMPERATURE: 98.6 F | OXYGEN SATURATION: 96 % | SYSTOLIC BLOOD PRESSURE: 145 MMHG

## 2017-04-22 VITALS
SYSTOLIC BLOOD PRESSURE: 151 MMHG | HEART RATE: 103 BPM | RESPIRATION RATE: 22 BRPM | OXYGEN SATURATION: 92 % | TEMPERATURE: 97.2 F | DIASTOLIC BLOOD PRESSURE: 67 MMHG

## 2017-04-22 VITALS
TEMPERATURE: 97.9 F | SYSTOLIC BLOOD PRESSURE: 159 MMHG | DIASTOLIC BLOOD PRESSURE: 73 MMHG | OXYGEN SATURATION: 92 % | RESPIRATION RATE: 17 BRPM | HEART RATE: 110 BPM

## 2017-04-22 VITALS
HEART RATE: 112 BPM | TEMPERATURE: 98.4 F | DIASTOLIC BLOOD PRESSURE: 74 MMHG | SYSTOLIC BLOOD PRESSURE: 144 MMHG | RESPIRATION RATE: 18 BRPM | OXYGEN SATURATION: 96 %

## 2017-04-22 VITALS
TEMPERATURE: 98.4 F | DIASTOLIC BLOOD PRESSURE: 67 MMHG | RESPIRATION RATE: 20 BRPM | OXYGEN SATURATION: 93 % | SYSTOLIC BLOOD PRESSURE: 155 MMHG | HEART RATE: 108 BPM

## 2017-04-22 VITALS — HEART RATE: 115 BPM

## 2017-04-22 RX ADMIN — VANCOMYCIN HYDROCHLORIDE SCH MG: 250 CAPSULE ORAL at 06:04

## 2017-04-22 RX ADMIN — VANCOMYCIN HYDROCHLORIDE SCH MG: 250 CAPSULE ORAL at 17:55

## 2017-04-22 RX ADMIN — IPRATROPIUM BROMIDE AND ALBUTEROL SULFATE SCH ML: .5; 3 SOLUTION RESPIRATORY (INHALATION) at 15:38

## 2017-04-22 RX ADMIN — Medication SCH MG: at 08:13

## 2017-04-22 RX ADMIN — MONTELUKAST SCH MG: 10 TABLET, FILM COATED ORAL at 17:55

## 2017-04-22 RX ADMIN — VANCOMYCIN HYDROCHLORIDE SCH MG: 250 CAPSULE ORAL at 11:50

## 2017-04-22 RX ADMIN — METRONIDAZOLE SCH MLS/HR: 500 SOLUTION INTRAVENOUS at 20:38

## 2017-04-22 RX ADMIN — Medication PRN MG: at 16:36

## 2017-04-22 RX ADMIN — VANCOMYCIN HYDROCHLORIDE SCH MG: 250 CAPSULE ORAL at 00:11

## 2017-04-22 RX ADMIN — Medication SCH CAP: at 20:37

## 2017-04-22 RX ADMIN — METRONIDAZOLE SCH MLS/HR: 500 SOLUTION INTRAVENOUS at 06:04

## 2017-04-22 RX ADMIN — IPRATROPIUM BROMIDE AND ALBUTEROL SULFATE SCH ML: .5; 3 SOLUTION RESPIRATORY (INHALATION) at 10:20

## 2017-04-22 RX ADMIN — Medication PRN MG: at 11:51

## 2017-04-22 RX ADMIN — ATORVASTATIN CALCIUM SCH MG: 20 TABLET, FILM COATED ORAL at 08:12

## 2017-04-22 RX ADMIN — LEVOTHYROXINE SODIUM SCH MG: 50 TABLET ORAL at 06:07

## 2017-04-22 RX ADMIN — METRONIDAZOLE SCH MLS/HR: 500 SOLUTION INTRAVENOUS at 14:16

## 2017-04-22 RX ADMIN — FAMOTIDINE SCH MG: 20 TABLET, FILM COATED ORAL at 20:38

## 2017-04-22 RX ADMIN — Medication SCH CAP: at 08:13

## 2017-04-22 RX ADMIN — Medication PRN MG: at 02:00

## 2017-04-22 RX ADMIN — CLOPIDOGREL BISULFATE SCH MG: 75 TABLET, FILM COATED ORAL at 08:13

## 2017-04-22 NOTE — NUR
RN ROUNDS

PATIENT RECEIVED MEDICATIONS AS PER ORDERED BY PULROSIE HULL AND DR. ALEX, EDUCATED PATIENT 
AND FAMILY MEMBERS OF POTENTIAL SIDE EFFECTS, BOTH VERBALIZED UNDERSTANDING, CALL LIZ LEFT 
NEXT TO PATIENT HAND, BED IN LOWEST POSITION, TWO SIDE RAILS UP, FALL PRECAUTIONS IN PLACE, 
WILL CONTINUE TO MONITOR PATIENT.

## 2017-04-22 NOTE — NUR
RN ROUNDS:

PT IS SLEEPING,NOT IN ANY ACUTE DISTRESS;  DAUGHTER AT BEDSIDE ; WILL CONTINUE TO MONITOR.

## 2017-04-22 NOTE — NUR
RN NOTES:

NOTICED THAT PTS O2 SAT IS DROPPING WHILE SLEEPING, PLACED PT BACK TO O2 2L NC ; SAT WENT UP 
TO 95% ; WILL CONTINUE TO MONITOR PT .

## 2017-04-22 NOTE — NUR
DR. ABI ALEX SEEN PATIENT, PATIENT AND FAMILY MEMBERS EXPRESSED CONCERNS ABOUT MOTHER'S 
INCREASED COUGHING AND WEAKNESS, DR. OCAMPO WAS ALSO HERE AND SEEN THE PATIENT, DR. OCAMPO 
ORDERED FOR PATIENT TO HAVE ONE TIME DOSE OF LASIX IV PUSH, PREDNISONE 5MG DAILY AND HE ASLO 
ORDERED A CXR, PER DR. ALEX PATIENT WILL NOT BE DISCHARGED AT THIS TIME, WILL FOLLOW UP

## 2017-04-22 NOTE — NUR
RN ROUNDS

PT. RESTING QUIETLY, VITAL SIGNS STABLE, NO DISTRESS NOTED, DENIES PAIN, CALL LIGHT WITHIN 
REACH, WILL CONTINUE TO MONITOR.

## 2017-04-22 NOTE — NUR
CLOSING NOTES:

PT IS SLEEPING COMFORTABLY; NOT IN ANY ACUTE DISTRESS; NO SIGNIFICANT CHANGES IN THE 
CONDITION ; ON ROOM AIR AT THIS TIME.WILL CONTINUE TO MONITOR AND WILL ENDORSE TO NEXT SHIFT 
NURSE.

## 2017-04-22 NOTE — NUR
RN ROUNDS

ASSISTED PATIENT BACK TO BED FROM BEDSIDE COMMODE, PATIENT TOLERATED FAIRLY, INSTRUCTED 
PATIENT TO USE CALL LIZ  IF ASSISTANCE IS NEEDED, PATIENT AND FAMILY VERBALIZED 
UNDERSTANDING, WILL CONTINUE TO MONITOR PATIENT. FALL PRECAUTIONS IN PLACE.

## 2017-04-22 NOTE — NUR
RN NOTES:

 PT FAMILY CALLED AND STATED THAT SHE IS WET , PT CLEANED , LINEN AND CHUX CHANGED ;PT 
TURNED AND REPOSITIONED ; AFTER THAT PT COUGHED AND VOMITED 25 CC WITH PHELM  ; ON ROOM AIR 
SAT 92 % AT THIS TIME ; WILL MONITOR PT ; PT STATED SHE FEELS COMFORTABLE NOW

## 2017-04-22 NOTE — NUR
RN ROUNDS

ASSISTED PATIENT BACK TO BED WITH HELP OF ADN NURSE, PATIENT STATED SHE WAS READY TO LIE 
BACK DOWN, GETTING SLEEPY WHILE SITTING IN CHAIR, NO OTHER NEEDS AT THIS TIME, CALL BELL 
WITHIN REACH, FALL PRECAUTIONS IN PLACE, WILL CONTINUE TO MONITOR PATIENT.

## 2017-04-22 NOTE — NUR
MEDICATIONS

MORNING MEDICATIONS GIVEN TO PATIENT, EDUCATED PATIENT AND DAUGHTER AT BEDSIDE ON POTENTIAL 
SIDE EFFECTS OF MEDICATIONS, BOTH VERBALIZED UNDERSTANDING, NO OTHER NEEDS AT THIS TIME, 
WILL CONTINUE TO MONITOR, CALL LIZ LEFT IN PATIENT'S HAND, FALL PRECAUTIONS IN PLACE.

## 2017-04-22 NOTE — NUR
RN NOTES:

ASSISTED PT TO THE BSC , PT VOIDED WELL , LINEN , CHUX  AND GOWN CHANGED ; ASSISTED PT BACK 
TO BED ; PT IS COMFORTABLE . WILL CONTINUE TO MONITOR.

## 2017-04-22 NOTE — NUR
RN NOTES:

ASSISTED PT TO THE BSC , PT VOIDED WELL , CHUX  CHANGED ; ASSISTED PT BACK TO BED ; PT IS 
COMFORTABLE . WILL CONTINUE TO MONITOR.

## 2017-04-22 NOTE — NUR
RN NOTES:

ASSISTED PT TO THE BEDSIDE COMMODE  WITH ASSISTANCE ; PT VOIDED WELL ; CLEANED PT , PT 
STATED SHE WANTS TO SIT  ; ASSISTED PT  TO CHAIR ; PT IS COMFORTABLE ; NOT IN ANY ACUTE 
DISTRESS; PT ASKED FOR ATIVAN , INFORMED PT THAT WILL GIVE IT ONCE SHE IS BACK TO BED . 
INFORMED AN TO MONITOR PT WHILE RN GOING FOR BREAK .

## 2017-04-22 NOTE — NUR
Initial Note

RECEIVED PATIENT FROM NIGHT SHIFT NURSE, PATIENT IS UP, SITTING ON EDGE OF BED, DAUGHTER IS 
AT BEDSIDE, NO SIGNS OF DISTRESS, NO COMPLAINTS OF PAIN OR DISCOMFORT, ASSESSMENT COMPLETE, 
PATIENT IS ALERT AND ORIENTED X 4, ON ROOM AIR AT 92%, PATIENT HAS IV ON RIGHT FOREARM WITH 
FLUIDS INFUSING, NO SIGNS OF INFILTRATION NOTED, PATIENT HAS BRUISING ON BOTH ARMS, THIGHS, 
AND LOWER RIGHT ABDOMEN, INSTRUCTED PATIENT TO USE CALL LIZ IF ASSISTANCE IS NEEDED, 
PATIENT VERBALIZED UNDERSTANDING, CALL LIZ LEFT IN HAND, BED IN LOWEST POSITION, BED ALARM 
ON, TWO SIDE RAILS UP FOR PATIENT'S SAFETY, FALL PRECAUTIONS IN PLACE, WILL CONTINUE TO 
MONITOR PATIENT.

## 2017-04-22 NOTE — NUR
CLOSING NOTES

PATIENT IS CURRENTLY RESTING IN BED, NO COMPLAINTS OF PAIN, NO SIGNS OF DISTRESS NOTED, SON 
IS AT BEDSIDE, ALL NEEDS MET, CALL BELL LEFT IN PATIENT'S HAND, BED IN LOWEST POSITION, BED 
ALARM ON, TWO SIDE RAILS UP, FALL PRECAUTIONS IN PLACE.

## 2017-04-22 NOTE — NUR
RN ROUNDS

PATIENT IS CURRENTLY UP TALKING TO FAMILY WHO IS AT BEDSIDE, NO NEEDS AT THIS TIME, WILL 
CONTINUE TO MONITOR PATIENT.

## 2017-04-22 NOTE — NUR
PM ASSESSMENT

PT. A/OX4, Maltese SPEAKING, VITAL SIGNS STABLE, NO DISTRESS NOTED, DENIES PAIN. FAMILY AT 
BEDSIDE UPDATED WITH PLAN OF CARE, CALL LIGHT WITHIN REACH, WILL CONTINUE TO MONITOR.

## 2017-04-22 NOTE — NUR
MEDICATION:

PT IS LYING ON THE BED , AWAKE , AS PER ORDER ATIVAN GIVEN TO THE PATIENT ,DAUGHTER STATED 
PT VOMITED AGAIN A LITTLE  ; WILL MONITOR PT .

## 2017-04-23 VITALS
DIASTOLIC BLOOD PRESSURE: 74 MMHG | RESPIRATION RATE: 18 BRPM | HEART RATE: 87 BPM | TEMPERATURE: 98.1 F | OXYGEN SATURATION: 91 % | SYSTOLIC BLOOD PRESSURE: 130 MMHG

## 2017-04-23 VITALS
OXYGEN SATURATION: 92 % | DIASTOLIC BLOOD PRESSURE: 57 MMHG | HEART RATE: 111 BPM | TEMPERATURE: 97 F | RESPIRATION RATE: 18 BRPM | SYSTOLIC BLOOD PRESSURE: 136 MMHG

## 2017-04-23 VITALS
RESPIRATION RATE: 16 BRPM | SYSTOLIC BLOOD PRESSURE: 130 MMHG | TEMPERATURE: 97.6 F | HEART RATE: 87 BPM | DIASTOLIC BLOOD PRESSURE: 74 MMHG | OXYGEN SATURATION: 91 %

## 2017-04-23 VITALS
HEART RATE: 113 BPM | TEMPERATURE: 97 F | RESPIRATION RATE: 20 BRPM | SYSTOLIC BLOOD PRESSURE: 137 MMHG | DIASTOLIC BLOOD PRESSURE: 74 MMHG

## 2017-04-23 VITALS
DIASTOLIC BLOOD PRESSURE: 71 MMHG | RESPIRATION RATE: 16 BRPM | TEMPERATURE: 97.3 F | SYSTOLIC BLOOD PRESSURE: 142 MMHG | OXYGEN SATURATION: 92 % | HEART RATE: 103 BPM

## 2017-04-23 RX ADMIN — Medication PRN MG: at 01:42

## 2017-04-23 RX ADMIN — FAMOTIDINE SCH MG: 20 TABLET, FILM COATED ORAL at 08:38

## 2017-04-23 RX ADMIN — Medication SCH CAP: at 08:37

## 2017-04-23 RX ADMIN — VANCOMYCIN HYDROCHLORIDE SCH MG: 250 CAPSULE ORAL at 06:09

## 2017-04-23 RX ADMIN — LEVOTHYROXINE SODIUM SCH MG: 50 TABLET ORAL at 06:09

## 2017-04-23 RX ADMIN — Medication PRN MG: at 06:40

## 2017-04-23 RX ADMIN — Medication SCH MG: at 08:37

## 2017-04-23 RX ADMIN — METRONIDAZOLE SCH MLS/HR: 500 SOLUTION INTRAVENOUS at 06:08

## 2017-04-23 RX ADMIN — VANCOMYCIN HYDROCHLORIDE SCH MG: 250 CAPSULE ORAL at 11:38

## 2017-04-23 RX ADMIN — VANCOMYCIN HYDROCHLORIDE SCH MG: 250 CAPSULE ORAL at 01:41

## 2017-04-23 RX ADMIN — ATORVASTATIN CALCIUM SCH MG: 20 TABLET, FILM COATED ORAL at 08:36

## 2017-04-23 RX ADMIN — CLOPIDOGREL BISULFATE SCH MG: 75 TABLET, FILM COATED ORAL at 08:37

## 2017-04-23 NOTE — NUR
RN ROUNDS

PATIENT IS CURRENTLY RESTING IN BED WITH EYES CLOSED, TWO DAUGHTERS AT BEDSIDE, NO SIGNS OF 
DISTRESS NOTED, BREATHING IS EVEN AND UNLABORED, NO OTHER NEEDS AT  THIS TIME, CALL BELL 
LEFT NEXT TO PATIENT'S HAND, BED IN LOWEST POSITION, TWO SIDE RAILS UP, BED ALARM ON, FALL 
PRECAUTIONS IN PLACE, WILL CONTINUE TO MONITOR PATIENT.

## 2017-04-23 NOTE — NUR
MEDICATIONS

PATIENT RECEIVED MORNING MEDICATIONS, REEDUCATED PATIENT AND PATIENT'S DAUGHTER  ON 
POTENTIAL SIDE EFFECTS OF MEDICATIONS, BOTH VERBALIZED UNDERSTANDING, NO OTHER NEEDS AT THIS 
TIME, PATIENT IS CURRENTLY SITTING UP IN CHAIR NEXT TO BEDSIDE, WILL CONTINUE TO MONITOR, 
CALL LIZ LEFT WITHIN REACH OF PATIENT, FALL PRECAUTIONS IN PLACE.

## 2017-04-23 NOTE — NUR
D/C Patient

Patient given medication reconciliation form and D/C instructions. Exit Care provided. 
Patient verbalized understanding. MD discussed with patient the results and treatment 
provided. Ambulatory with steady gait for discharge to home. Patient in stable condition, ID 
band removed. IV catheter removed, intact and dressing applied, no active bleeding. Rx of 
Vancomycin, Ativan, Cutterella  given. Patient educated on pain management. All belongings 
sent with patient.

## 2017-04-23 NOTE — NUR
RN ROUNDS

PATIENT IS CURRENTLY RESTING IN BED, NO COMPLAINTS OF PAIN OR DISCOMFORT AT THIS TIME, NO 
OTHER NEEDS AT THIS TIME, WILL CONTINUE TO MONITOR PATIENT.

## 2017-04-23 NOTE — NUR
CLOSING NOTES

PT. IS COUGHING THIS MORNING, TESSLALON PEARLE TO BE GIVEN AS ORDERED. VITAL SIGNS STABLE, 
IV ACCESS PATENT AND BENIGN.

## 2017-04-23 NOTE — NUR
Initial Note

PATIENT IS RESTING IN BED CURRENTLY UP, DAUGHTER IS AT BEDSIDE, NO SIGNS OF DISTRESS NOTED, 
PATIENT HAS NO COMPLAINTS OF PAIN, JUST COUGHING, COUGH MEDICATIONS WAS GIVEN TO HELP WITH 
COUGH, ASSESSMENT COMPLETE, PATIENT HAS IV ON RIGHT HAND WITH ANTIBIOTICS RUNNING , NO SIGNS 
OF INFILTRATION NOTED, INSTRUCTED PATIENT TO USE CALL LIZ IF ASSISTANCE IS NEEDED, PATIENT 
VERBALIZED UNDERSTANDING, CALL LIZ LEFT IN HAND, BED IN LOWEST POSITION, BED ALARM ON, TWO 
SIDE RAILS UP FOR PATIENT'S SAFETY, FALL PRECAUTIONS IN PLACE, WILL CONTINUE TO MONITOR 
PATIENT.

## 2017-04-26 NOTE — NUR
Discharge Follow Up Phone Call:

LCSW called and spoke with pt's dtr, Dorothy ([phone]).  Pt's dtr states that pt is 
doing well; pt's prescriptions have been filled; there are no questions regarding discharge 
or medication instructions; pt has already attended follow up appointments with Dr. Hendrickson 
and pt's PCP; pt checks her blood sugar as directed by PCP.  Pt's dtr did not express any 
other needs or concerns and denied the need for additional follow up at this time.  No 
further follow up phone calls required at this time.

## 2017-05-30 ENCOUNTER — HOSPITAL ENCOUNTER (INPATIENT)
Dept: HOSPITAL 4 - SED | Age: 77
LOS: 3 days | Discharge: HOME | DRG: 871 | End: 2017-06-02
Attending: INTERNAL MEDICINE | Admitting: INTERNAL MEDICINE
Payer: COMMERCIAL

## 2017-05-30 VITALS — HEIGHT: 63 IN | BODY MASS INDEX: 30.83 KG/M2 | WEIGHT: 174 LBS

## 2017-05-30 VITALS — SYSTOLIC BLOOD PRESSURE: 87 MMHG

## 2017-05-30 VITALS — SYSTOLIC BLOOD PRESSURE: 103 MMHG

## 2017-05-30 VITALS — SYSTOLIC BLOOD PRESSURE: 82 MMHG

## 2017-05-30 DIAGNOSIS — N17.0: ICD-10-CM

## 2017-05-30 DIAGNOSIS — A41.9: Primary | ICD-10-CM

## 2017-05-30 DIAGNOSIS — E78.5: ICD-10-CM

## 2017-05-30 DIAGNOSIS — Z95.1: ICD-10-CM

## 2017-05-30 DIAGNOSIS — Z87.81: ICD-10-CM

## 2017-05-30 DIAGNOSIS — D86.9: ICD-10-CM

## 2017-05-30 DIAGNOSIS — N39.0: ICD-10-CM

## 2017-05-30 DIAGNOSIS — I10: ICD-10-CM

## 2017-05-30 DIAGNOSIS — I25.10: ICD-10-CM

## 2017-05-30 DIAGNOSIS — K57.92: ICD-10-CM

## 2017-05-30 DIAGNOSIS — Z79.82: ICD-10-CM

## 2017-05-30 DIAGNOSIS — E03.9: ICD-10-CM

## 2017-05-30 DIAGNOSIS — Z79.899: ICD-10-CM

## 2017-05-30 DIAGNOSIS — N12: ICD-10-CM

## 2017-05-30 DIAGNOSIS — Z88.8: ICD-10-CM

## 2017-05-30 LAB
ALBUMIN SERPL BCP-MCNC: 3.2 G/DL (ref 3.4–4.8)
ALT SERPL W P-5'-P-CCNC: 19 U/L (ref 12–78)
ANION GAP SERPL CALCULATED.3IONS-SCNC: 8 MMOL/L (ref 5–15)
APPEARANCE UR: (no result)
AST SERPL W P-5'-P-CCNC: 20 U/L (ref 10–37)
BACTERIA URNS QL MICRO: (no result) /HPF
BASOPHILS # BLD AUTO: 0.1 K/UL (ref 0–0.2)
BASOPHILS NFR BLD AUTO: 0.7 % (ref 0–2)
BILIRUB SERPL-MCNC: 0.4 MG/DL (ref 0–1)
BILIRUB UR QL STRIP: NEGATIVE
BUN SERPL-MCNC: 24 MG/DL (ref 8–21)
CALCIUM SERPL-MCNC: 8.9 MG/DL (ref 8.4–11)
CHLORIDE SERPL-SCNC: 100 MMOL/L (ref 98–107)
COLOR UR: YELLOW
CREAT SERPL-MCNC: 1.76 MG/DL (ref 0.55–1.3)
EOSINOPHIL # BLD AUTO: 0.6 K/UL (ref 0–0.4)
EOSINOPHIL NFR BLD AUTO: 4.3 % (ref 0–4)
ERYTHROCYTE [DISTWIDTH] IN BLOOD BY AUTOMATED COUNT: 14.2 % (ref 9–15)
FINE GRAN CASTS URNS QL MICRO: (no result) /LPF
GFR SERPL CREATININE-BSD FRML MDRD: (no result) ML/MIN (ref 90–?)
GLUCOSE SERPL-MCNC: 157 MG/DL (ref 70–99)
GLUCOSE UR STRIP-MCNC: NEGATIVE MG/DL
HCT VFR BLD AUTO: 34.1 % (ref 36–48)
HGB BLD-MCNC: 11.5 G/DL (ref 12–16)
HGB UR QL STRIP: (no result)
INR PPP: 1.2 (ref 0.8–1.2)
KETONES UR STRIP-MCNC: NEGATIVE MG/DL
LEUKOCYTE ESTERASE UR QL STRIP: (no result)
LIPASE SERPL-CCNC: 109 U/L (ref 73–393)
LYMPHOCYTES # BLD AUTO: 1.8 K/UL (ref 1–5.5)
LYMPHOCYTES NFR BLD AUTO: 13.5 % (ref 20.5–51.5)
MCH RBC QN AUTO: 29 PG (ref 27–31)
MCHC RBC AUTO-ENTMCNC: 34 % (ref 32–36)
MCV RBC AUTO: 88 FL (ref 79–98)
MONOCYTES # BLD MANUAL: 1.4 K/UL (ref 0–1)
MONOCYTES # BLD MANUAL: 10.6 % (ref 1.7–9.3)
MUCOUS THREADS URNS QL MICRO: (no result) /LPF
NEUTROPHILS # BLD AUTO: 9.7 K/UL (ref 1.8–7.7)
NEUTROPHILS NFR BLD AUTO: 70.9 % (ref 40–70)
NITRITE UR QL STRIP: NEGATIVE
PH UR STRIP: 5.5 [PH] (ref 5–8)
PLATELET # BLD AUTO: 237 K/UL (ref 130–430)
POTASSIUM SERPL-SCNC: 4.2 MMOL/L (ref 3.5–5.1)
PROT SERPL-MCNC: 6.9 G/DL (ref 6.4–8.3)
PROT UR QL STRIP: NEGATIVE
PROTHROMBIN TIME: 12.5 SECS (ref 9.5–12.5)
RBC # BLD AUTO: 3.9 MIL/UL (ref 4.2–6.2)
RBC #/AREA URNS HPF: (no result) /HPF (ref 0–3)
SODIUM SERPLBLD-SCNC: 133 MMOL/L (ref 136–145)
SP GR UR STRIP: 1.01 (ref 1–1.03)
URATE CRY URNS QL MICRO: (no result) /HPF
UROBILINOGEN UR STRIP-MCNC: 0.2 MG/DL (ref 0.2–1)
WBC # BLD AUTO: 13.6 K/UL (ref 4.8–10.8)
WBC #/AREA URNS HPF: (no result) /HPF (ref 0–3)

## 2017-05-30 RX ADMIN — SODIUM CHLORIDE SCH MLS/HR: 9 INJECTION, SOLUTION INTRAVENOUS at 22:50

## 2017-05-30 NOTE — NUR
Admission Note

Received patient from ER with diagnosis of SEPSIS, UTI. Initial Plan of Care 
discussed-patient verbalized understanding. Family at bedside. Oriented to room, call light, 
pain management and safety.

## 2017-05-30 NOTE — NUR
Initial note

A/O x 3, no SOB, no chest pain, denied pain at this time.  Denied flank pain, denied N/V.  
Skin warm to touch, IV at L hand #22, patent.  Clear lung sounds and active bowel sounds.  
+2 radial and pedal pulses.  No edema.  BP check now is 101/59, 2nd NS bolus given now.  
Patient denied dizziness or headache.  Oriented room to patient and family.  Call light 
within reach, bed at lowest position, bed alarm on, will continue to monitor patient.

## 2017-05-30 NOTE — NUR
Patient will be admitted to care of  orders received from Dr. Betancourt 
.  Admitted to tele unit.  Will go to room 110B  Belongings list completed.  
Summary report printed. Report will be given at bedside.

## 2017-05-30 NOTE — NUR
Patient will be admitted to care of Dr. Dang.  Admitted to med surg unit.  
Will go to room 110 B.  Belongings list completed.  Summary report printed. 
Report given to REJI Chu.

## 2017-05-30 NOTE — NUR
Placed in room 3  . Placed on cardiac monitor, blood pressure machine and pulse 
oximeter. To gown for exam. Side rails up. Report given to Florin MENDOZA.

## 2017-05-30 NOTE — NUR
Per pt's daughter, pt has had R lower flank pain for last 2-3 days, pain scale 
7/10. Pt's daughter states that pt has had episodes of dizziness and weakness 
for "a few days", pt states having 4 episodes of vomiting for 1 hour up to 
current ER visit. Pt denies diarrhea. Pt's daughter states pt has been unable 
to digest food effectively and vomits. Pt PERRLA, AAOx4, able to communicate 
effectively with family members at bedside. Pt denies any other complaints.

## 2017-05-30 NOTE — NUR
ADMISSION NOTE

Received patient from ER via megan, received report from Caren MENDOZA. Patient admitted with 
diagnosis of sepsis and UTI. Patient oriented to hospital routine, call light, toileting and 
safety-patient verbalized understanding.

## 2017-05-30 NOTE — NUR
MD VITCOR

CALLED [facility] AT [phone] SPOKE WITH DR.REZVANI VINES FARHAD ON CALL.

## 2017-05-31 VITALS — SYSTOLIC BLOOD PRESSURE: 112 MMHG

## 2017-05-31 VITALS — SYSTOLIC BLOOD PRESSURE: 107 MMHG

## 2017-05-31 VITALS — SYSTOLIC BLOOD PRESSURE: 102 MMHG

## 2017-05-31 VITALS — SYSTOLIC BLOOD PRESSURE: 134 MMHG

## 2017-05-31 VITALS — SYSTOLIC BLOOD PRESSURE: 124 MMHG

## 2017-05-31 VITALS — SYSTOLIC BLOOD PRESSURE: 118 MMHG

## 2017-05-31 VITALS — SYSTOLIC BLOOD PRESSURE: 136 MMHG

## 2017-05-31 LAB
ALBUMIN SERPL BCP-MCNC: 2.7 G/DL (ref 3.4–4.8)
ALT SERPL W P-5'-P-CCNC: 18 U/L (ref 12–78)
ANION GAP SERPL CALCULATED.3IONS-SCNC: 9 MMOL/L (ref 5–15)
AST SERPL W P-5'-P-CCNC: 21 U/L (ref 10–37)
BASOPHILS # BLD AUTO: 0 K/UL (ref 0–0.2)
BASOPHILS NFR BLD AUTO: 0.2 % (ref 0–2)
BILIRUB SERPL-MCNC: 0.4 MG/DL (ref 0–1)
BUN SERPL-MCNC: 18 MG/DL (ref 8–21)
CALCIUM SERPL-MCNC: 8.2 MG/DL (ref 8.4–11)
CHLORIDE SERPL-SCNC: 106 MMOL/L (ref 98–107)
CREAT SERPL-MCNC: 1.43 MG/DL (ref 0.55–1.3)
EOSINOPHIL # BLD AUTO: 0.1 K/UL (ref 0–0.4)
EOSINOPHIL NFR BLD AUTO: 1.2 % (ref 0–4)
ERYTHROCYTE [DISTWIDTH] IN BLOOD BY AUTOMATED COUNT: 14.3 % (ref 9–15)
GFR SERPL CREATININE-BSD FRML MDRD: (no result) ML/MIN (ref 90–?)
GLUCOSE SERPL-MCNC: 206 MG/DL (ref 70–99)
HCT VFR BLD AUTO: 32 % (ref 36–48)
HGB BLD-MCNC: 10.5 G/DL (ref 12–16)
LYMPHOCYTES # BLD AUTO: 1.1 K/UL (ref 1–5.5)
LYMPHOCYTES NFR BLD AUTO: 10.1 % (ref 20.5–51.5)
MCH RBC QN AUTO: 29 PG (ref 27–31)
MCHC RBC AUTO-ENTMCNC: 33 % (ref 32–36)
MCV RBC AUTO: 89 FL (ref 79–98)
MONOCYTES # BLD MANUAL: 0.1 K/UL (ref 0–1)
MONOCYTES # BLD MANUAL: 1 % (ref 1.7–9.3)
NEUTROPHILS # BLD AUTO: 9.7 K/UL (ref 1.8–7.7)
NEUTROPHILS NFR BLD AUTO: 87.5 % (ref 40–70)
PLATELET # BLD AUTO: 226 K/UL (ref 130–430)
POTASSIUM SERPL-SCNC: 4.3 MMOL/L (ref 3.5–5.1)
PROT SERPL-MCNC: 6 G/DL (ref 6.4–8.3)
RBC # BLD AUTO: 3.61 MIL/UL (ref 4.2–6.2)
SODIUM SERPLBLD-SCNC: 136 MMOL/L (ref 136–145)
WBC # BLD AUTO: 11 K/UL (ref 4.8–10.8)

## 2017-05-31 RX ADMIN — SODIUM CHLORIDE SCH MLS/HR: 9 INJECTION, SOLUTION INTRAVENOUS at 11:28

## 2017-05-31 RX ADMIN — CLOPIDOGREL BISULFATE SCH MG: 75 TABLET, FILM COATED ORAL at 09:39

## 2017-05-31 RX ADMIN — Medication SCH UNIT: at 09:39

## 2017-05-31 RX ADMIN — FLUTICASONE FUROATE AND VILANTEROL TRIFENATATE SCH EACH: 100; 25 POWDER RESPIRATORY (INHALATION) at 13:26

## 2017-05-31 RX ADMIN — SODIUM CHLORIDE SCH MLS/HR: 9 INJECTION, SOLUTION INTRAVENOUS at 22:11

## 2017-05-31 RX ADMIN — SODIUM CHLORIDE SCH MLS/HR: 9 INJECTION, SOLUTION INTRAVENOUS at 06:03

## 2017-05-31 RX ADMIN — Medication SCH CAP: at 22:11

## 2017-05-31 RX ADMIN — SODIUM CHLORIDE SCH MLS/HR: 9 INJECTION, SOLUTION INTRAVENOUS at 13:29

## 2017-05-31 RX ADMIN — MONTELUKAST SCH MG: 10 TABLET, FILM COATED ORAL at 17:26

## 2017-05-31 RX ADMIN — ATORVASTATIN CALCIUM SCH MG: 20 TABLET, FILM COATED ORAL at 09:38

## 2017-05-31 RX ADMIN — PREDNISONE SCH MG: 5 TABLET ORAL at 09:39

## 2017-05-31 RX ADMIN — Medication SCH MG: at 13:25

## 2017-05-31 RX ADMIN — LEVOTHYROXINE SODIUM SCH MG: 50 TABLET ORAL at 06:03

## 2017-05-31 RX ADMIN — Medication SCH CAP: at 09:38

## 2017-05-31 NOTE — NUR
am notes- in bed awake, alert and oriented. family at bedside. ambulate with walker with 
supervision. denies any pain or discomfort at this time. ivf infusing well. no distress 
noted. will monitor.

## 2017-05-31 NOTE — NUR
Initial Round



Received patient in bed with family at bedside. Pt is awake, alert oriented x3. V/S are WNL. 
No s/s of any pain or distress noted. IV noted to R wrist g 22, no infiltrate and with good 
blood return. All extremities are strong BRP. Discussed plan of care with pt and verbalized 
understanding. Bed in low position with call light within reach, will cont to monitor.

## 2017-05-31 NOTE — NUR
Assisted patient to bathroom for urination

Family/daughter (Suma) at bedside.  Both patient and Suma aware of stool collection for 
C-Diff x 3.

## 2017-05-31 NOTE — NUR
Assisted to B/R 



Assisted to b/r and safely back to bed. No s/s of any distress noted. Bed in low position 
with side rails up. Call light within reach, will cont to monitor.

## 2017-05-31 NOTE — NUR
Assisted patient to bathroom for urination

Clear yellow urine noted, patient denied dysuria.  No dizziness, no pain, no SOB, no chest 
pain.  Was covered by several sheets and blankets, sweat some.  Pillow case and gown 
changed.

## 2017-05-31 NOTE — NUR
Nutrition Update



Wicho Scale 18 noted.

Pt admitted for UTI, sepsis.

Diet: regular

BMI: 31 kg/m2



RD to follow per nutrition care standards.

## 2017-05-31 NOTE — NUR
Closing note

Sleeping in bed.  No SOB, no chest pain, no grimacing.  Family at bedside.  Call light 
within reach, bed at lowest position, report given to incoming nurser regarding ASA 81 mg 
and 325 mg as well patient is taking Prednisone while she's allergic to methylprednisolone.  
Also informed about C-Diff/stool collection needed.

## 2017-05-31 NOTE — NUR
Rounds

Patient sleeping/resting in bed, arousable.  Skin warm to touch.  C/o cold and shaking.  
Body temp 98.3-98.4 F, gave 1 warm sheet and 1 warm blanket.  No SOB, no chest pain, denied 
pain.  Family at bedside.  Call light within reach, bed at lowest position, will continue to 
monitor patient.

## 2017-05-31 NOTE — NUR
Rounds

Patient sleeping/resting in bed, arousable.  No shaking.  No SOB, no chest pain, denied 
pain.  Family at bedside.  Call light within reach, bed at lowest position, will continue to 
monitor patient.

## 2017-05-31 NOTE — NUR
notes- assisted to the bathroom with walker and voided. denies any pain or discomfort at 
this time. family always at bedside. no distress noted.

## 2017-05-31 NOTE — NUR
MD bulmaro Betancourt is here and had new order for C-diff and med reconcile.  Order carried out.

-------------------------------------------------------------------------------

Addendum: 05/31/17 at 0038 by Chris Thomas RN

-------------------------------------------------------------------------------

Confirmed with family/daughter (Suma) that patient is take Aspirin 81 mg PO daily for blood 
thinner, 325 mg PO daily for stent, and Prednisone 5 mg PO daily.  And patient is allergic 
to methylprednisolone.

 is aware of patient is taking both Aspirin 81mg and 325 mg PO daily for stent.   also 
aware of that patient is allergic to methylprednisolone, but patient is taking prednisone at 
home.

## 2017-05-31 NOTE — NUR
Rounds

Resting/sleeping in bed.  No SOB, no chest pain, denied pain at this time.  Denied flank 
pain, denied N/V.  Continued IVF.  Patient denied dizziness or headache.  Family at bedside. 
 Call light within reach, bed at lowest position, bed alarm on, will continue to monitor 
patient.

## 2017-05-31 NOTE — NUR
Rounds



Patient is awake with family at bedside. No s/s of any distress noted. Call light within 
reach, will cont to monitor.

## 2017-05-31 NOTE — NUR
notes- assisted to the bathroom and voided. denies any pain or discomfort. family at 
bedside. all needs meet. will endorse

## 2017-05-31 NOTE — NUR
Rounds

Awake in bed.  No SOB, no chest pain, denied pain, denied N/V.  Infused Zosyn and gave 
Synthroid.  Family at bedside.  Call light within reach, bed at lowest position, will 
continue to monitor patient.

## 2017-06-01 VITALS — SYSTOLIC BLOOD PRESSURE: 131 MMHG

## 2017-06-01 VITALS — SYSTOLIC BLOOD PRESSURE: 136 MMHG

## 2017-06-01 VITALS — SYSTOLIC BLOOD PRESSURE: 115 MMHG

## 2017-06-01 VITALS — SYSTOLIC BLOOD PRESSURE: 113 MMHG

## 2017-06-01 VITALS — SYSTOLIC BLOOD PRESSURE: 130 MMHG

## 2017-06-01 RX ADMIN — DIPHENHYDRAMINE HYDROCHLORIDE PRN MG: 50 CAPSULE ORAL at 23:48

## 2017-06-01 RX ADMIN — PREDNISONE SCH MG: 5 TABLET ORAL at 08:12

## 2017-06-01 RX ADMIN — SODIUM CHLORIDE SCH MLS/HR: 9 INJECTION, SOLUTION INTRAVENOUS at 06:03

## 2017-06-01 RX ADMIN — HYDROMORPHONE HYDROCHLORIDE PRN MG: 8 TABLET ORAL at 23:48

## 2017-06-01 RX ADMIN — DIPHENHYDRAMINE HYDROCHLORIDE PRN MG: 50 CAPSULE ORAL at 17:48

## 2017-06-01 RX ADMIN — SODIUM CHLORIDE SCH MLS/HR: 9 INJECTION, SOLUTION INTRAVENOUS at 14:00

## 2017-06-01 RX ADMIN — LEVOTHYROXINE SODIUM SCH MG: 50 TABLET ORAL at 06:01

## 2017-06-01 RX ADMIN — SODIUM CHLORIDE SCH MLS/HR: 9 INJECTION, SOLUTION INTRAVENOUS at 13:58

## 2017-06-01 RX ADMIN — Medication SCH CAP: at 08:13

## 2017-06-01 RX ADMIN — Medication SCH UNIT: at 08:13

## 2017-06-01 RX ADMIN — Medication SCH CAP: at 21:48

## 2017-06-01 RX ADMIN — HYDROMORPHONE HYDROCHLORIDE PRN MG: 8 TABLET ORAL at 08:06

## 2017-06-01 RX ADMIN — CLOPIDOGREL BISULFATE SCH MG: 75 TABLET, FILM COATED ORAL at 08:12

## 2017-06-01 RX ADMIN — MONTELUKAST SCH MG: 10 TABLET, FILM COATED ORAL at 17:48

## 2017-06-01 RX ADMIN — ATORVASTATIN CALCIUM SCH MG: 20 TABLET, FILM COATED ORAL at 08:13

## 2017-06-01 RX ADMIN — SODIUM CHLORIDE SCH MLS/HR: 9 INJECTION, SOLUTION INTRAVENOUS at 22:27

## 2017-06-01 RX ADMIN — FLUTICASONE FUROATE AND VILANTEROL TRIFENATATE SCH EACH: 100; 25 POWDER RESPIRATORY (INHALATION) at 08:12

## 2017-06-01 RX ADMIN — SODIUM CHLORIDE SCH MLS/HR: 9 INJECTION, SOLUTION INTRAVENOUS at 06:02

## 2017-06-01 RX ADMIN — Medication SCH MG: at 08:12

## 2017-06-01 NOTE — NUR
INITIAL NOTES; 

-Pt is a/ox4, resting in bed. Georgian speaking. Daughter is at bedside. Pt denies any 
pain,sob,or any acute distress. Saline jeanie of left hand #22,patent, no s/s any 
infiltration after flushed w/ NS,drsg cdi. IVF NS @ 75ml/hr. All safety measures in place. 
Discussed poc,all safety measures with pt and family, pt verbalized understanding. Also, 
instructed pt not to get out bed by self, to use call light for assistance, pt and daughter 
verbalized understanding. Call light w/in reach. Continue to monitor pt.

## 2017-06-01 NOTE — NUR
Assisted to B/R 



Assisted to b/r and safely back to bed, daughter at bedside. No s/s of any distress noted. 
Bed in low position with side rails up. Call light within reach, will cont to monitor.

## 2017-06-01 NOTE — NUR
IV PLACEMENT: 

# 22 gauge angiocath placed to rt hand by Marina nurse. Use of asceptic technique.  
Opsite placed over site.  Blood return noted. Flushed with 10 cc of normal saline.  No 
evidence of infiltration noted.  Patient tolerated well.

## 2017-06-01 NOTE — NUR
DR NEELY MAKING ROUNDS MADE AWARE OF RASH, PER MD RASH IS DUE TO ZOSYN, CONTINUE TO ADMINISTER 
WITH ZOSYN AND MONITOR RASH AND OTHER SYMPTOMS , MD ORDERED TO CHANGE BENADRYL ORDER TO PO 
NOT IV .

## 2017-06-01 NOTE — NUR
INITIAL NOTE

PT RESTING, EASY TO AROUSE, French SPEAKING ONLY, FAMILY AT BESIDE, CAN AID IN TRANSLATION, 
PT OTHER WISE ALERT AND ORIENTED, NO S/S OF SOB OR PAIN, PT C/O OF RASH TO BILATERAL BACK, 
WILL NOTIFY MD AND FOLLOW UP, IV TO LEFT WRIST INTACT AND INFUSING FLUIDS AT ORDERED RATE, 
PLAN OF CARE DISCUSSES, PATIENT AND FAMILY VERBALIZED UNDERSTANDING, SAFETY MEASURES IN 
PLACE, BED IN LOW POSITION AND LOCKED, CALL LIGHT WITHIN REACH, WILL FOLLOW UP

## 2017-06-01 NOTE — NUR
Final Rounds



Patient is resting at this time with daughter at bedside. No s/s of pain or any distress 
noted. V/S are wnl. All needs met and anticipated by noc nurses. Bed in low position with 
side rails up x2. Call light within reach, will endorse.

## 2017-06-01 NOTE — NUR
ROUNDS;ANXIOUS AND PAIN MEDICATION ADMINISTERED

-Pt is resting in bed. Daughter is at bedside. Pt is c/o generalized pain and anxious, gave 
Lorazepam 0.5mg po and Tylenol 500mg po, hand #22,patent, no s/s any infiltration after 
flushed w/ NS,drsg cdi. IVF NS @ 75ml/hr. All safety measures in place. Call light w/in 
reach. Continue to monitor pt.

## 2017-06-01 NOTE — NUR
ROUNDS;

-Pt is resting in bed. Daughter is at bedside. Pt denies any pain,sob,or any acute distress. 
 hand #22,patent, no s/s any infiltration after flushed w/ NS,waldemar cdi. IVF NS @ 75ml/hr. 
All safety measures in place. Call light w/in reach. Continue to monitor pt.

## 2017-06-01 NOTE — NUR
IV RE-INSERTION:

Complaining of pain to IV site. Restarted on LEFT WRIST, 22G. Successful after 1 attempts. 
Resumed current IVF of NORMAL SALINE and regulated @ 75 per hour. Will observe for any signs 
of infiltration.

## 2017-06-01 NOTE — NUR
DR SALINAS MAKING ROUNDS, PER MD IF PATIENT IS NOT HAVING DIARRHEA, MAY CANCEL ORDERS FOR 
CDIFF, PT MADE AWARE, WILL FOLLOW UP

## 2017-06-01 NOTE — NUR
ROUNDS

PT LAYING IN BED RESTING, NO S/S OF DISTRESS OR PAIN, VS WITHIN NORMAL RANGE, FAMILY AT 
BEDSIDE, ALL NEEDS ATTENDED TO, SAFETY MEASURES IN PLACE, WILL FOLLOW UP

## 2017-06-01 NOTE — NUR
rounds

pt assisted to restroom, weakness noted, no s/s of distress or c/o pain, alert and oriented, 
pt returned to bed, positioned for dinner, safety measures in place, call guy wooten, 
family at bedside, will continue to monitor

## 2017-06-01 NOTE — NUR
ROUNDS

PT SITTING UP IN BED, SON AT BEDSIDE, NO S/S OF DISTRESS OR PAIN, PER PATIENT, RASH IS THE 
SAME IF NOT A LITTLE BETTER, WILL CONTINUE TO MONITOR, SAFETY MEASURES IN PLACE, CALL LIGHT 
WITHIN REACH, WILL FOLLOW UP

## 2017-06-01 NOTE — NUR
CLOSING NOTE

PT SITTING IN BED, FAMILY MEMBERS AT BEDSIDE, NO S/S OF PAIN OR DISTRESS, IV FLUIDS INFUSING 
TO LEFT HAND, NO S/S OF INFILTRATION NOTED, ALL NEEDS ATTENDED TO THROUGHOUT SHIFT, SAFETY 
MEASURES MAINTAINED, BED IN LOW POSITION AND LOCKED, WILL GIVE REPORT TO FOLLOWING SHIFT.

## 2017-06-02 VITALS — SYSTOLIC BLOOD PRESSURE: 143 MMHG

## 2017-06-02 VITALS — SYSTOLIC BLOOD PRESSURE: 181 MMHG

## 2017-06-02 VITALS — SYSTOLIC BLOOD PRESSURE: 129 MMHG

## 2017-06-02 VITALS — SYSTOLIC BLOOD PRESSURE: 157 MMHG

## 2017-06-02 VITALS — SYSTOLIC BLOOD PRESSURE: 159 MMHG

## 2017-06-02 LAB
BASOPHILS # BLD AUTO: 0 K/UL (ref 0–0.2)
BASOPHILS NFR BLD AUTO: 0.2 % (ref 0–2)
EOSINOPHIL # BLD AUTO: 0.4 K/UL (ref 0–0.4)
EOSINOPHIL NFR BLD AUTO: 4.1 % (ref 0–4)
ERYTHROCYTE [DISTWIDTH] IN BLOOD BY AUTOMATED COUNT: 14.1 % (ref 9–15)
HCT VFR BLD AUTO: 30.1 % (ref 36–48)
HGB BLD-MCNC: 9.8 G/DL (ref 12–16)
LYMPHOCYTES # BLD AUTO: 2.3 K/UL (ref 1–5.5)
LYMPHOCYTES NFR BLD AUTO: 22.4 % (ref 20.5–51.5)
MCH RBC QN AUTO: 29 PG (ref 27–31)
MCHC RBC AUTO-ENTMCNC: 33 % (ref 32–36)
MCV RBC AUTO: 89 FL (ref 79–98)
MONOCYTES # BLD MANUAL: 1.2 K/UL (ref 0–1)
MONOCYTES # BLD MANUAL: 11.1 % (ref 1.7–9.3)
NEUTROPHILS # BLD AUTO: 6.5 K/UL (ref 1.8–7.7)
NEUTROPHILS NFR BLD AUTO: 62.2 % (ref 40–70)
PLATELET # BLD AUTO: 213 K/UL (ref 130–430)
RBC # BLD AUTO: 3.39 MIL/UL (ref 4.2–6.2)
WBC # BLD AUTO: 10.4 K/UL (ref 4.8–10.8)

## 2017-06-02 RX ADMIN — FLUTICASONE FUROATE AND VILANTEROL TRIFENATATE SCH EACH: 100; 25 POWDER RESPIRATORY (INHALATION) at 09:01

## 2017-06-02 RX ADMIN — SODIUM CHLORIDE SCH MLS/HR: 9 INJECTION, SOLUTION INTRAVENOUS at 14:06

## 2017-06-02 RX ADMIN — LEVOTHYROXINE SODIUM SCH MG: 50 TABLET ORAL at 06:09

## 2017-06-02 RX ADMIN — PREDNISONE SCH MG: 5 TABLET ORAL at 09:02

## 2017-06-02 RX ADMIN — CLOPIDOGREL BISULFATE SCH MG: 75 TABLET, FILM COATED ORAL at 09:02

## 2017-06-02 RX ADMIN — Medication SCH CAP: at 09:02

## 2017-06-02 RX ADMIN — DIPHENHYDRAMINE HYDROCHLORIDE PRN MG: 50 CAPSULE ORAL at 14:04

## 2017-06-02 RX ADMIN — MONTELUKAST SCH MG: 10 TABLET, FILM COATED ORAL at 17:21

## 2017-06-02 RX ADMIN — SODIUM CHLORIDE SCH MLS/HR: 9 INJECTION, SOLUTION INTRAVENOUS at 06:09

## 2017-06-02 RX ADMIN — DIPHENHYDRAMINE HYDROCHLORIDE PRN MG: 50 CAPSULE ORAL at 06:09

## 2017-06-02 RX ADMIN — Medication SCH MG: at 09:02

## 2017-06-02 RX ADMIN — SODIUM CHLORIDE SCH MLS/HR: 9 INJECTION, SOLUTION INTRAVENOUS at 06:10

## 2017-06-02 RX ADMIN — Medication SCH UNIT: at 09:02

## 2017-06-02 RX ADMIN — ATORVASTATIN CALCIUM SCH MG: 20 TABLET, FILM COATED ORAL at 09:02

## 2017-06-02 NOTE — NUR
NOTE

DR NEELY WAS PAGED AGAIN - NO CALL BACK REC'D. PT'S FAMILY REQUESTED THE CALL TO DR NEELY AT 
THIS TIME. PT STABLE AND RESTING IN BED. PT WAS ASSISTED WITH SHOWER AT THIS TIME BY CNA. PT 
ABLE TO AMBULATE IN ROOM AND TO BED WITH FWW AND STANDBY ASSIST. NO NEEDS NOTED. CALL LIGHT 
WITHIN REACH.

## 2017-06-02 NOTE — NUR
NOTE

PT'S IV WAS DC'D AND SITE BENIGN. NO SWELLING/REDNESS/BLEEDING/DRAINAGE NOTED AT SITE. NO 
TENDERNESS NOTED. PT DENIES ANY SOB/RESP DISTRESS OR PAIN/DISCOMFORT AT THIS TIME. PT 
DRESSED IN STREET CLOTHES. ALL BELONGINGS WERE PACKED BY PT'S SON AND FAMILY. CHECKED SIDE 
TABLE AND DRAWERS FOR BELONGINGS. 

PT OFF THE FLOOR VIA WHEELCHAIR WITH ALL HER BELONGINGS AND DISCHARGE PAPERWORK. PT'S 
DISCHARGE INSTRUCTIONS WERE DISCUSSED WITH PT AND HER SON. QUESTIONS/CONCERNS WERE ANSWERED 
AT THIS TIME.

## 2017-06-02 NOTE — NUR
NOTE

PT WAS SEEN AND ASSESSED BY DR SHARP. QUESTIONS/CONCERNS WERE ANSWERED AT THIS TIME. DR SHARP 
NOTED PT COULD GO HOME IF OKAY BY DR NEELY. DR NEELY TO BE PAGED AT THIS TIME. PT'S DAUGHTER AT 
BEDSIDE SINCE START OF SHIFT. CALL LIGHT WITHIN REACH.

## 2017-06-02 NOTE — NUR
prescription for antibiotic

      patient needs prescription of levaquin 250mg P.O daily x 7 days to start tomorrow, 
patient's pharmacy is close on weekend. Dr Rodriguez made aware and will write prescription beny. 
am once she arrive in hospital. Son agree to  the prescription tomorrow once written 
by Dr Rodriguez. Will endorse to incoming night shift charge nurse.

## 2017-06-02 NOTE — NUR
ATTENDING MD ON CALL DR SHARP WAS CALLED, RE: REQUEST OF SUSIE LOZA MD TO GIVE 
DISCHARGED PRESCRIPTION FOR

THE PT IS GOING HOME. SPOKE TO KILLIAN

## 2017-06-02 NOTE — NUR
NOTE

PT SITTING ON SIDE OF BED EATING HER BREAKFAST AT THIS TIME. PT'S IVF'S INFUSING WELL 
THROUGH RIGHT HAND IV SITE. NO SOB/RESP DISTRESS OR PAIN/DISCOMFORT NOTED. CALL LIGHT WITHIN 
REACH.

## 2017-06-02 NOTE — NUR
ROUNDS;

-Pt is resting in bed. Daughter is at bedside.  No s/s any infiltration IV noted. IVF NS @ 
75ml/hr. All safety measures in place. Call light w/in reach. Continue to monitor pt

## 2017-06-02 NOTE — NUR
ROUNDS;

-Pt is resting in bed. Daughter is at bedside. IV site of rt hand patent, no s/s any 
infiltration noted. IVF NS @ 75ml/hr. All safety measures in place. Call light w/in reach. 
Continue to monitor pt

## 2017-06-02 NOTE — NUR
NOTE

DR NEELY WAS CALLED AGAIN PER PT'S SON'S REQUEST - THONG. WAITING FOR CALL BACK AT THIS TIME. 
PT RESTING IN BED. CALL LIGHT WITHIN REACH.

## 2017-06-02 NOTE — NUR
BATHROOM WITH ASSISTANCE

-Pt returned from bathroom with a walker, assisting by daughter returned to bed safely. Fall 
precaution in place. Call light w/in reach. Continue to monitor pt.

## 2017-06-02 NOTE — NUR
CLOSING NOTES; 

-Pt is resting in bed. No s/s any pain or acute distress noted. Daughter is at bedside. 
Saline jeanie of left hand patent, no s/s any infiltration of IV site noted. IVF NS @ 
75ml/hr. All safety measures in place. Call light w/in reach. Will endorse to oncoming nurse 
to continue care.

## 2017-06-06 ENCOUNTER — HOSPITAL ENCOUNTER (INPATIENT)
Dept: HOSPITAL 4 - SED | Age: 77
LOS: 1 days | Discharge: HOME | DRG: 607 | End: 2017-06-07
Attending: INTERNAL MEDICINE | Admitting: INTERNAL MEDICINE
Payer: COMMERCIAL

## 2017-06-06 VITALS — SYSTOLIC BLOOD PRESSURE: 131 MMHG

## 2017-06-06 VITALS — SYSTOLIC BLOOD PRESSURE: 166 MMHG

## 2017-06-06 VITALS — BODY MASS INDEX: 29.41 KG/M2 | WEIGHT: 166 LBS | HEIGHT: 63 IN

## 2017-06-06 DIAGNOSIS — Z87.440: ICD-10-CM

## 2017-06-06 DIAGNOSIS — Z88.8: ICD-10-CM

## 2017-06-06 DIAGNOSIS — Z95.1: ICD-10-CM

## 2017-06-06 DIAGNOSIS — D86.9: ICD-10-CM

## 2017-06-06 DIAGNOSIS — E03.9: ICD-10-CM

## 2017-06-06 DIAGNOSIS — T37.0X5A: ICD-10-CM
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
## 2017-06-06 DIAGNOSIS — I25.10: ICD-10-CM

## 2017-06-06 DIAGNOSIS — R00.0: ICD-10-CM

## 2017-06-06 DIAGNOSIS — Z86.19: ICD-10-CM

## 2017-06-06 DIAGNOSIS — R30.0: ICD-10-CM

## 2017-06-06 DIAGNOSIS — D72.829: ICD-10-CM

## 2017-06-06 DIAGNOSIS — L27.0: Primary | ICD-10-CM

## 2017-06-06 LAB
ALBUMIN SERPL BCP-MCNC: 3.3 G/DL (ref 3.4–4.8)
ALT SERPL W P-5'-P-CCNC: 26 U/L (ref 12–78)
ANION GAP SERPL CALCULATED.3IONS-SCNC: 6 MMOL/L (ref 5–15)
APPEARANCE UR: CLEAR
AST SERPL W P-5'-P-CCNC: 20 U/L (ref 10–37)
BASOPHILS NFR BLD MANUAL: 0 % (ref 0–2)
BILIRUB SERPL-MCNC: 0.5 MG/DL (ref 0–1)
BILIRUB UR QL STRIP: NEGATIVE
BUN SERPL-MCNC: 21 MG/DL (ref 8–21)
CALCIUM SERPL-MCNC: 9.3 MG/DL (ref 8.4–11)
CHLORIDE SERPL-SCNC: 100 MMOL/L (ref 98–107)
COLOR UR: YELLOW
CREAT SERPL-MCNC: 1.13 MG/DL (ref 0.55–1.3)
EOSINOPHIL NFR BLD MANUAL: 1 % (ref 0–7)
ERYTHROCYTE [DISTWIDTH] IN BLOOD BY AUTOMATED COUNT: 13.7 % (ref 9–15)
GFR SERPL CREATININE-BSD FRML MDRD: (no result) ML/MIN (ref 90–?)
GLUCOSE SERPL-MCNC: 185 MG/DL (ref 70–99)
GLUCOSE UR STRIP-MCNC: NEGATIVE MG/DL
HCT VFR BLD AUTO: 34.6 % (ref 36–48)
HGB BLD-MCNC: 11.9 G/DL (ref 12–16)
HGB UR QL STRIP: NEGATIVE
INR PPP: 1.1 (ref 0.8–1.2)
KETONES UR STRIP-MCNC: NEGATIVE MG/DL
LEUKOCYTE ESTERASE UR QL STRIP: NEGATIVE
LYMPHOCYTES NFR BLD MANUAL: 6 % (ref 20–46)
MCH RBC QN AUTO: 30 PG (ref 27–31)
MCHC RBC AUTO-ENTMCNC: 34 % (ref 32–36)
MCV RBC AUTO: 88 FL (ref 79–98)
MONOCYTES # BLD MANUAL: 2 % (ref 0–11)
NEUTS BAND NFR BLD MANUAL: 20 % (ref 0–6)
NITRITE UR QL STRIP: NEGATIVE
PH UR STRIP: 6 [PH] (ref 5–8)
PLATELET # BLD AUTO: 347 K/UL (ref 130–430)
POTASSIUM SERPL-SCNC: 3.6 MMOL/L (ref 3.5–5.1)
PROT SERPL-MCNC: 7 G/DL (ref 6.4–8.3)
PROT UR QL STRIP: NEGATIVE
PROTHROMBIN TIME: 11.5 SECS (ref 9.5–12.5)
RBC # BLD AUTO: 3.95 MIL/UL (ref 4.2–6.2)
SODIUM SERPLBLD-SCNC: 134 MMOL/L (ref 136–145)
SP GR UR STRIP: 1.02 (ref 1–1.03)
T4 FREE SERPL-MCNC: 0.9 NG/DL (ref 0.6–1.6)
UROBILINOGEN UR STRIP-MCNC: 0.2 MG/DL (ref 0.2–1)
VARIANT LYMPHS NFR BLD MANUAL: 2 % (ref 0–0)
WBC # BLD AUTO: 14.3 K/UL (ref 4.8–10.8)

## 2017-06-06 RX ADMIN — DEXTROSE AND SODIUM CHLORIDE SCH MLS/HR: 5; 900 INJECTION, SOLUTION INTRAVENOUS at 20:44

## 2017-06-07 VITALS — SYSTOLIC BLOOD PRESSURE: 121 MMHG

## 2017-06-07 VITALS — SYSTOLIC BLOOD PRESSURE: 105 MMHG

## 2017-06-07 VITALS — SYSTOLIC BLOOD PRESSURE: 137 MMHG

## 2017-06-07 VITALS — SYSTOLIC BLOOD PRESSURE: 118 MMHG

## 2017-06-07 LAB
ALBUMIN SERPL BCP-MCNC: 2.7 G/DL (ref 3.4–4.8)
ALT SERPL W P-5'-P-CCNC: 20 U/L (ref 12–78)
ANION GAP SERPL CALCULATED.3IONS-SCNC: 5 MMOL/L (ref 5–15)
AST SERPL W P-5'-P-CCNC: 17 U/L (ref 10–37)
BASOPHILS # BLD AUTO: 0 K/UL (ref 0–0.2)
BASOPHILS NFR BLD AUTO: 0.2 % (ref 0–2)
BILIRUB SERPL-MCNC: 0.6 MG/DL (ref 0–1)
BUN SERPL-MCNC: 16 MG/DL (ref 8–21)
CALCIUM SERPL-MCNC: 8.7 MG/DL (ref 8.4–11)
CHLORIDE SERPL-SCNC: 102 MMOL/L (ref 98–107)
CREAT SERPL-MCNC: 1.12 MG/DL (ref 0.55–1.3)
EOSINOPHIL # BLD AUTO: 0.2 K/UL (ref 0–0.4)
EOSINOPHIL NFR BLD AUTO: 2.5 % (ref 0–4)
ERYTHROCYTE [DISTWIDTH] IN BLOOD BY AUTOMATED COUNT: 14 % (ref 9–15)
GFR SERPL CREATININE-BSD FRML MDRD: (no result) ML/MIN (ref 90–?)
GLUCOSE SERPL-MCNC: 140 MG/DL (ref 70–99)
HCT VFR BLD AUTO: 31 % (ref 36–48)
HGB BLD-MCNC: 10.3 G/DL (ref 12–16)
LYMPHOCYTES # BLD AUTO: 1.2 K/UL (ref 1–5.5)
LYMPHOCYTES NFR BLD AUTO: 13.4 % (ref 20.5–51.5)
MCH RBC QN AUTO: 30 PG (ref 27–31)
MCHC RBC AUTO-ENTMCNC: 33 % (ref 32–36)
MCV RBC AUTO: 89 FL (ref 79–98)
MONOCYTES # BLD MANUAL: 0.7 K/UL (ref 0–1)
MONOCYTES # BLD MANUAL: 7.6 % (ref 1.7–9.3)
NEUTROPHILS # BLD AUTO: 7 K/UL (ref 1.8–7.7)
NEUTROPHILS NFR BLD AUTO: 76.3 % (ref 40–70)
PLATELET # BLD AUTO: 295 K/UL (ref 130–430)
POTASSIUM SERPL-SCNC: 3.3 MMOL/L (ref 3.5–5.1)
PROT SERPL-MCNC: 6 G/DL (ref 6.4–8.3)
RBC # BLD AUTO: 3.5 MIL/UL (ref 4.2–6.2)
SODIUM SERPLBLD-SCNC: 136 MMOL/L (ref 136–145)
WBC # BLD AUTO: 9.1 K/UL (ref 4.8–10.8)

## 2017-06-07 RX ADMIN — DEXTROSE AND SODIUM CHLORIDE SCH MLS/HR: 5; 900 INJECTION, SOLUTION INTRAVENOUS at 09:34

## 2017-06-08 NOTE — NUR
Discharge Follow Up Phone Call

LCSW phoned patient, [phone], on 6/6/17 and left a voicemail message with offer of 
assistance and Social Service contact information. Patient was readmitted 6/6/17.

## 2019-06-08 ENCOUNTER — HOSPITAL ENCOUNTER (EMERGENCY)
Dept: HOSPITAL 4 - SED | Age: 79
Discharge: HOME | End: 2019-06-08
Payer: COMMERCIAL

## 2019-06-08 VITALS — BODY MASS INDEX: 28.85 KG/M2 | WEIGHT: 169 LBS | HEIGHT: 64 IN

## 2019-06-08 VITALS — SYSTOLIC BLOOD PRESSURE: 132 MMHG

## 2019-06-08 VITALS — SYSTOLIC BLOOD PRESSURE: 136 MMHG

## 2019-06-08 DIAGNOSIS — Z88.8: ICD-10-CM

## 2019-06-08 DIAGNOSIS — Z79.82: ICD-10-CM

## 2019-06-08 DIAGNOSIS — I10: ICD-10-CM

## 2019-06-08 DIAGNOSIS — E11.9: ICD-10-CM

## 2019-06-08 DIAGNOSIS — Z86.79: ICD-10-CM

## 2019-06-08 DIAGNOSIS — I73.89: Primary | ICD-10-CM

## 2019-06-08 DIAGNOSIS — Z79.899: ICD-10-CM

## 2019-11-02 NOTE — NUR
paged Dr. Dang (Dr. Kramer on call)

Spoke with Yumiko, phone gxipt [phone] All other review of systems negative, except as noted in HPI
